# Patient Record
Sex: FEMALE | Race: WHITE | URBAN - METROPOLITAN AREA
[De-identification: names, ages, dates, MRNs, and addresses within clinical notes are randomized per-mention and may not be internally consistent; named-entity substitution may affect disease eponyms.]

---

## 2017-07-24 ENCOUNTER — HOSPITAL ENCOUNTER (INPATIENT)
Facility: CLINIC | Age: 57
LOS: 5 days | Discharge: HOME | DRG: 872 | End: 2017-07-29
Attending: EMERGENCY MEDICINE | Admitting: INTERNAL MEDICINE
Payer: COMMERCIAL

## 2017-07-24 ENCOUNTER — APPOINTMENT (OUTPATIENT)
Dept: CT IMAGING | Facility: CLINIC | Age: 57
DRG: 872 | End: 2017-07-24
Attending: EMERGENCY MEDICINE
Payer: COMMERCIAL

## 2017-07-24 DIAGNOSIS — K57.12: ICD-10-CM

## 2017-07-24 PROBLEM — K57.92 ACUTE DIVERTICULITIS: Status: ACTIVE | Noted: 2017-07-24

## 2017-07-24 LAB
ALBUMIN UR-MCNC: 30 MG/DL
ANION GAP SERPL CALCULATED.3IONS-SCNC: 5 MMOL/L (ref 3–14)
APPEARANCE UR: CLEAR
BACTERIA #/AREA URNS HPF: ABNORMAL /HPF
BASOPHILS # BLD AUTO: 0.1 10E9/L (ref 0–0.2)
BASOPHILS NFR BLD AUTO: 0.3 %
BILIRUB UR QL STRIP: NEGATIVE
BUN SERPL-MCNC: 10 MG/DL (ref 7–30)
CALCIUM SERPL-MCNC: 9.2 MG/DL (ref 8.5–10.1)
CHLORIDE SERPL-SCNC: 101 MMOL/L (ref 94–109)
CO2 SERPL-SCNC: 29 MMOL/L (ref 20–32)
COLOR UR AUTO: YELLOW
CREAT SERPL-MCNC: 0.83 MG/DL (ref 0.52–1.04)
DIFFERENTIAL METHOD BLD: ABNORMAL
EOSINOPHIL # BLD AUTO: 0 10E9/L (ref 0–0.7)
EOSINOPHIL NFR BLD AUTO: 0.1 %
ERYTHROCYTE [DISTWIDTH] IN BLOOD BY AUTOMATED COUNT: 12.4 % (ref 10–15)
GFR SERPL CREATININE-BSD FRML MDRD: 70 ML/MIN/1.7M2
GLUCOSE SERPL-MCNC: 134 MG/DL (ref 70–99)
GLUCOSE UR STRIP-MCNC: NEGATIVE MG/DL
HCT VFR BLD AUTO: 41.5 % (ref 35–47)
HGB BLD-MCNC: 14 G/DL (ref 11.7–15.7)
HGB UR QL STRIP: NEGATIVE
IMM GRANULOCYTES # BLD: 0.1 10E9/L (ref 0–0.4)
IMM GRANULOCYTES NFR BLD: 0.6 %
KETONES UR STRIP-MCNC: NEGATIVE MG/DL
LACTATE BLD-SCNC: 1.3 MMOL/L (ref 0.7–2.1)
LEUKOCYTE ESTERASE UR QL STRIP: ABNORMAL
LYMPHOCYTES # BLD AUTO: 1.2 10E9/L (ref 0.8–5.3)
LYMPHOCYTES NFR BLD AUTO: 6.2 %
MCH RBC QN AUTO: 31.5 PG (ref 26.5–33)
MCHC RBC AUTO-ENTMCNC: 33.7 G/DL (ref 31.5–36.5)
MCV RBC AUTO: 93 FL (ref 78–100)
MONOCYTES # BLD AUTO: 1.6 10E9/L (ref 0–1.3)
MONOCYTES NFR BLD AUTO: 8.1 %
MUCOUS THREADS #/AREA URNS LPF: PRESENT /LPF
NEUTROPHILS # BLD AUTO: 16.2 10E9/L (ref 1.6–8.3)
NEUTROPHILS NFR BLD AUTO: 84.7 %
NITRATE UR QL: NEGATIVE
NRBC # BLD AUTO: 0 10*3/UL
NRBC BLD AUTO-RTO: 0 /100
PH UR STRIP: 6 PH (ref 5–7)
PLATELET # BLD AUTO: 291 10E9/L (ref 150–450)
POTASSIUM SERPL-SCNC: 3.6 MMOL/L (ref 3.4–5.3)
RBC # BLD AUTO: 4.45 10E12/L (ref 3.8–5.2)
RBC #/AREA URNS AUTO: 2 /HPF (ref 0–2)
SODIUM SERPL-SCNC: 135 MMOL/L (ref 133–144)
SP GR UR STRIP: 1.02 (ref 1–1.03)
SQUAMOUS #/AREA URNS AUTO: 1 /HPF (ref 0–1)
URN SPEC COLLECT METH UR: ABNORMAL
UROBILINOGEN UR STRIP-MCNC: 0 MG/DL (ref 0–2)
WBC # BLD AUTO: 19.2 10E9/L (ref 4–11)
WBC #/AREA URNS AUTO: 1 /HPF (ref 0–2)

## 2017-07-24 PROCEDURE — 25000128 H RX IP 250 OP 636: Performed by: INTERNAL MEDICINE

## 2017-07-24 PROCEDURE — 25800025 ZZH RX 258: Performed by: INTERNAL MEDICINE

## 2017-07-24 PROCEDURE — 83605 ASSAY OF LACTIC ACID: CPT | Performed by: INTERNAL MEDICINE

## 2017-07-24 PROCEDURE — 25000128 H RX IP 250 OP 636: Performed by: EMERGENCY MEDICINE

## 2017-07-24 PROCEDURE — 81001 URINALYSIS AUTO W/SCOPE: CPT | Performed by: EMERGENCY MEDICINE

## 2017-07-24 PROCEDURE — 96374 THER/PROPH/DIAG INJ IV PUSH: CPT

## 2017-07-24 PROCEDURE — 25000125 ZZHC RX 250: Performed by: INTERNAL MEDICINE

## 2017-07-24 PROCEDURE — 74177 CT ABD & PELVIS W/CONTRAST: CPT

## 2017-07-24 PROCEDURE — 36415 COLL VENOUS BLD VENIPUNCTURE: CPT | Performed by: INTERNAL MEDICINE

## 2017-07-24 PROCEDURE — 85025 COMPLETE CBC W/AUTO DIFF WBC: CPT | Performed by: EMERGENCY MEDICINE

## 2017-07-24 PROCEDURE — 99285 EMERGENCY DEPT VISIT HI MDM: CPT | Mod: 25

## 2017-07-24 PROCEDURE — 99223 1ST HOSP IP/OBS HIGH 75: CPT | Mod: AI | Performed by: INTERNAL MEDICINE

## 2017-07-24 PROCEDURE — 12000000 ZZH R&B MED SURG/OB

## 2017-07-24 PROCEDURE — 80048 BASIC METABOLIC PNL TOTAL CA: CPT | Performed by: EMERGENCY MEDICINE

## 2017-07-24 RX ORDER — MULTIVITAMIN,THERAPEUTIC
1 TABLET ORAL DAILY
COMMUNITY

## 2017-07-24 RX ORDER — FAMOTIDINE 20 MG
1000 TABLET ORAL DAILY
COMMUNITY

## 2017-07-24 RX ORDER — ONDANSETRON 2 MG/ML
4 INJECTION INTRAMUSCULAR; INTRAVENOUS EVERY 6 HOURS PRN
Status: DISCONTINUED | OUTPATIENT
Start: 2017-07-24 | End: 2017-07-29 | Stop reason: HOSPADM

## 2017-07-24 RX ORDER — ZOLPIDEM TARTRATE 5 MG/1
5 TABLET ORAL
Status: DISCONTINUED | OUTPATIENT
Start: 2017-07-24 | End: 2017-07-29 | Stop reason: HOSPADM

## 2017-07-24 RX ORDER — ERTAPENEM 1 G/1
1 INJECTION, POWDER, LYOPHILIZED, FOR SOLUTION INTRAMUSCULAR; INTRAVENOUS ONCE
Status: COMPLETED | OUTPATIENT
Start: 2017-07-24 | End: 2017-07-24

## 2017-07-24 RX ORDER — ERTAPENEM 1 G/1
1 INJECTION, POWDER, LYOPHILIZED, FOR SOLUTION INTRAMUSCULAR; INTRAVENOUS EVERY 24 HOURS
Status: DISCONTINUED | OUTPATIENT
Start: 2017-07-25 | End: 2017-07-25

## 2017-07-24 RX ORDER — HYDROMORPHONE HYDROCHLORIDE 1 MG/ML
.3-.5 INJECTION, SOLUTION INTRAMUSCULAR; INTRAVENOUS; SUBCUTANEOUS
Status: DISCONTINUED | OUTPATIENT
Start: 2017-07-24 | End: 2017-07-29 | Stop reason: HOSPADM

## 2017-07-24 RX ORDER — NALOXONE HYDROCHLORIDE 0.4 MG/ML
.1-.4 INJECTION, SOLUTION INTRAMUSCULAR; INTRAVENOUS; SUBCUTANEOUS
Status: DISCONTINUED | OUTPATIENT
Start: 2017-07-24 | End: 2017-07-29 | Stop reason: HOSPADM

## 2017-07-24 RX ORDER — IOPAMIDOL 755 MG/ML
500 INJECTION, SOLUTION INTRAVASCULAR ONCE
Status: COMPLETED | OUTPATIENT
Start: 2017-07-24 | End: 2017-07-24

## 2017-07-24 RX ORDER — PROCHLORPERAZINE 25 MG
25 SUPPOSITORY, RECTAL RECTAL EVERY 12 HOURS PRN
Status: DISCONTINUED | OUTPATIENT
Start: 2017-07-24 | End: 2017-07-29 | Stop reason: HOSPADM

## 2017-07-24 RX ORDER — HYDROMORPHONE HYDROCHLORIDE 1 MG/ML
0.5 INJECTION, SOLUTION INTRAMUSCULAR; INTRAVENOUS; SUBCUTANEOUS
Status: DISCONTINUED | OUTPATIENT
Start: 2017-07-24 | End: 2017-07-27

## 2017-07-24 RX ORDER — PROCHLORPERAZINE MALEATE 5 MG
5-10 TABLET ORAL EVERY 6 HOURS PRN
Status: DISCONTINUED | OUTPATIENT
Start: 2017-07-24 | End: 2017-07-29 | Stop reason: HOSPADM

## 2017-07-24 RX ORDER — ACETAMINOPHEN 10 MG/ML
1000 INJECTION, SOLUTION INTRAVENOUS EVERY 6 HOURS PRN
Status: DISCONTINUED | OUTPATIENT
Start: 2017-07-24 | End: 2017-07-27

## 2017-07-24 RX ORDER — DEXTROSE MONOHYDRATE, SODIUM CHLORIDE, AND POTASSIUM CHLORIDE 50; 1.49; 4.5 G/1000ML; G/1000ML; G/1000ML
INJECTION, SOLUTION INTRAVENOUS CONTINUOUS
Status: DISCONTINUED | OUTPATIENT
Start: 2017-07-24 | End: 2017-07-29 | Stop reason: HOSPADM

## 2017-07-24 RX ORDER — METRONIDAZOLE 500 MG/1
500 TABLET ORAL 3 TIMES DAILY
Status: ON HOLD | COMMUNITY
Start: 2017-07-23 | End: 2017-07-29

## 2017-07-24 RX ORDER — CIPROFLOXACIN 500 MG/1
500 TABLET, FILM COATED ORAL 2 TIMES DAILY
Status: ON HOLD | COMMUNITY
Start: 2017-07-23 | End: 2017-07-29

## 2017-07-24 RX ORDER — ONDANSETRON 4 MG/1
4 TABLET, ORALLY DISINTEGRATING ORAL EVERY 6 HOURS PRN
Status: DISCONTINUED | OUTPATIENT
Start: 2017-07-24 | End: 2017-07-29 | Stop reason: HOSPADM

## 2017-07-24 RX ADMIN — SODIUM CHLORIDE 1000 ML: 9 INJECTION, SOLUTION INTRAVENOUS at 13:38

## 2017-07-24 RX ADMIN — SODIUM CHLORIDE 61 ML: 9 INJECTION, SOLUTION INTRAVENOUS at 12:04

## 2017-07-24 RX ADMIN — ERTAPENEM SODIUM 1 G: 1 INJECTION, POWDER, LYOPHILIZED, FOR SOLUTION INTRAMUSCULAR; INTRAVENOUS at 13:40

## 2017-07-24 RX ADMIN — HYDROMORPHONE HYDROCHLORIDE 0.5 MG: 1 INJECTION, SOLUTION INTRAMUSCULAR; INTRAVENOUS; SUBCUTANEOUS at 13:42

## 2017-07-24 RX ADMIN — HYDROMORPHONE HYDROCHLORIDE 0.5 MG: 1 INJECTION, SOLUTION INTRAMUSCULAR; INTRAVENOUS; SUBCUTANEOUS at 23:33

## 2017-07-24 RX ADMIN — IOPAMIDOL 85 ML: 755 INJECTION, SOLUTION INTRAVENOUS at 12:01

## 2017-07-24 RX ADMIN — HYDROMORPHONE HYDROCHLORIDE 0.3 MG: 1 INJECTION, SOLUTION INTRAMUSCULAR; INTRAVENOUS; SUBCUTANEOUS at 19:16

## 2017-07-24 RX ADMIN — ACETAMINOPHEN 1000 MG: 10 INJECTION, SOLUTION INTRAVENOUS at 16:16

## 2017-07-24 RX ADMIN — HYDROMORPHONE HYDROCHLORIDE 0.5 MG: 1 INJECTION, SOLUTION INTRAMUSCULAR; INTRAVENOUS; SUBCUTANEOUS at 11:12

## 2017-07-24 RX ADMIN — POTASSIUM CHLORIDE, DEXTROSE MONOHYDRATE AND SODIUM CHLORIDE: 150; 5; 450 INJECTION, SOLUTION INTRAVENOUS at 16:14

## 2017-07-24 ASSESSMENT — ENCOUNTER SYMPTOMS
VOMITING: 0
ABDOMINAL PAIN: 1
HEMATURIA: 0
APPETITE CHANGE: 1
BACK PAIN: 1
NAUSEA: 0
DYSURIA: 0
FREQUENCY: 0

## 2017-07-24 NOTE — PLAN OF CARE
Problem: Goal Outcome Summary  Goal: Goal Outcome Summary  Outcome: No Change  Pt admitted for diverticulitis.  Pain controlled right now with dilaudid. Denies nausea at this time. Bowel sounds active.  Plan of care conservative treatment, may need surgery consult.

## 2017-07-24 NOTE — IP AVS SNAPSHOT
MRN:[id number]                      After Visit Summary   7/24/2017    Jennifer Payne    MRN: [id number]           Thank you!     Thank you for choosing [facility] for your care. Our goal is always to provide you with excellent care. Hearing back from our patients is one way we can continue to improve our services. Please take a few minutes to complete the written survey that you may receive in the mail after you visit. If you would like to speak to someone directly about your visit please contact Patient Relations at [phone]. Thank you!          Patient Information     Date Of Birth          1960        Designated Caregiver       Most Recent Value    Caregiver    Will someone help with your care after discharge? no      About your hospital stay     You were admitted on:  July 24, 2017 You last received care in the:  [place] [address] [facility]    You were discharged on:  July 29, 2017       Who to Call     For medical emergencies, please call 911.  For non-urgent questions about your medical care, please call your primary care provider or clinic, [phone]          Attending Provider     Provider Specialty    Jeff Gill MD Emergency Medicine    [facility], [facility]       Primary Care Provider Office Phone # Fax #    [facility] [phone]      After Care Instructions     Activity       Your activity upon discharge: activity as tolerated            Diet       Follow this diet upon discharge: Low fiber                  Follow-up Appointments     Follow-up and recommended labs and tests        Follow up with primary care provider, [facility], within 7 days for hospital follow- up.  Follow up with General Surgery within 2 weeks.                  Further instructions from your care team       HOME CARE FOLLOWING DIVERTICULITIS ADMISSION  CARMENZA Hicks E. Gavin, N. Guttormson, D.  "SHIELA Lenz L. Thomas   Special instructions for Jennifer Payne:  --call and schedule visit with Dr. Lenz at the end of your antibiotic course    --Discharge prescriptions: Augmentin x 14 days         DRAIN/DRAIN SITE CARE:  If you are discharged from the hospital with a drain in place, monitor and record the output as instructed by your nurse.  Once the output has appropriately decreased, the drain will be removed at your surgeon's office.  Replace the bandage over your drain (or previous drain site) until all drainage stops, or if more comfortable to have in place.      BATHING:  If you had a drain in place at any time, avoid baths until 1 week after drain removal.  Showers are okay any time after the drain is out.  You may wash your hair at any time.  Gently pat your incision dry after bathing before replacing a dressing.    ACTIVITY:  Light Activity -- you may immediately be up and about as tolerated.  Driving -- you may drive when comfortable and off narcotic pain medications.  Light Work -- resume when comfortable off pain medications.  (If you can drive, you probably can work.)  Strenuous Work/Activity -- limit lifting to 15 pounds for 1-2 weeks.  Then, progressively increase with time.  Active Sports (running, biking, etc.) -- cautiously resume after 4-6 weeks, or when cleared by your surgeon.    DISCOMFORT:  Use pain medications as prescribed by your surgeon.  Take the pain medication with some food, when possible, to minimize side effects.  Expect gradual improvement.    ANTIBIOTIC THERAPY:  Finish the entire course of antibiotics which have been prescribed.  Contact your surgeon's office if you finish your course of antibiotics and are still feeling any residual abdominal pain or signs of your infection.    DIET:  Continue on a \"soft\" diet (i.e. cooked vegetables, soups, processed meats, light/white bread, mashed potatoes, rice, yogurt) for the first week after discharge from " the hospital.  After this time, you may return to diet you were on before surgery.  In general, and specifically while taking pain medications, increase dietary fiber or add a fiber supplementation like Metamucil or Citrucel to help prevent constipation (this is also a possible side effect of pain medications).  Drink plenty of fluids.    RETURN APPOINTMENT:  Schedule a follow-up visit 2-3 weeks after discharge from the hospital.  Office Phone:  [phone]     CONTACT US IF THE FOLLOWING DEVELOPS:   1. A fever that is above 101     2. If there is a large amount of drainage, bleeding, or swelling.   3. Severe pain that is not relieved by your prescription.   4. Drainage that is thick, cloudy, yellow, green or white.   5. Any other questions not answered by  Frequently Asked Questions  sheet.      FREQUENTLY ASKED QUESTIONS:    Q:  What can I do to minimize constipation (very hard stools, or lack of stools)?  A:  Stay well hydrated.  Increase your dietary fiber intake or take a fiber supplement -with plenty of water.  Walk around frequently.  You may consider an over-the-counter stool-softener.  Your Pharmacist can assist you with choosing one that is stocked at your pharmacy.  Constipation is also one of the most common side effects of pain medication.  If you are using pain medication, be pro-active and try to PREVENT problems with constipation by taking the steps above BEFORE constipation becomes a problem.    Q:  What do I do if I need more pain medications?  A:  Call the office to receive refills.  Be aware that certain pain meds cannot be called into a pharmacy and actually require a paper prescription.  A change may be made in your pain med as you progress thru your recovery period or if you have side effects to certain meds.    --Pain meds are NOT refilled after 5pm on weekdays, and NOT AT ALL on the weekends, so please look ahead to prevent problems.      Q:  Why am I having a hard time sleeping now that I  am at home?  A:  Many medications you receive while you are in the hospital can impact your sleep for a number of days after your surgery/hospitalization.  Decreased level of activity and naps during the day may also make sleeping at night difficult.  Try to minimize day-time naps, and get up frequently during the day to walk around your home during your recovery time.  Sleep aides may be of some help, but are not recommended for long-term use.      Q:  I am having some back discomfort.  What should I do?  A:  This may be related to certain positioning that was required for your surgery, extended periods of time in bed, or other changes in your overall activity level.  You may try ice, heat, acetaminophen, or ibuprofen to treat this temporarily.  Note that many pain medications have acetaminophen in them and would state this on the prescription bottle.  Be sure not to exceed the maximum of 4000mg per day of acetaminophen.     **If the pain you are having does not resolve, is severe, or is a flare of back pain you have had on other occasions prior to surgery, please contact your primary physician for further recommendations or for an appointment to be examined at their office.    Q:  Why am I having headaches?  A:  Headaches can be caused by many things:  caffeine withdrawal, use of pain meds, dehydration, high blood pressure, lack of sleep, over-activity/exhaustion, flare-up of usual migraine headaches.  If you feel this is related to muscle tension (a band-like feeling around the head, or a pressure at the low-back of the head) you may try ice or heat to this area.  You may need to drink more fluids (try electrolyte drink like Gatorade), rest, or take your usual migraine medications.   **If your headaches do not resolve, worsen, are accompanied by other symptoms, or if your blood pressure is high, please call your primary physician for recommendation and/or examination.        If you have other questions, please  "call the office Monday thru Friday between 8am and 5pm to discuss with the nurse or physician assistant.  #[phone]    There is a surgeon ON CALL on weekday evenings and over the weekend in case of urgent need only, and may be contacted at the same number.    If you are having an emergency, call 911 or proceed to your nearest emergency department.    Pending Results     No orders found from 2017 to 2017.            Statement of Approval     Ordered          17 1026  I have reviewed and agree with all the recommendations and orders detailed in this document.  EFFECTIVE NOW     Approved and electronically signed by:  Sudeep Garcia, DO           17 0957  I have reviewed and agree with all the recommendations and orders detailed in this document.  EFFECTIVE NOW     Approved and electronically signed by:  Lucina Campos PA-C             Admission Information     Date & Time Provider Department Dept. Phone    2017 Jesus Sosa MD [place] [address] [facility] [phone]      Your Vitals Were     Blood Pressure Pulse Temperature Respirations Weight Pulse Oximetry    128/79 (BP Location: Left arm) 81 98.6  F (37  C) (Oral) 16 78.5 kg (173 lb 1.6 oz) 98%      MyChart Information     [facility] lets you send messages to your doctor, view your test results, renew your prescriptions, schedule appointments and more. To sign up, go to [email or web address] . Click on \"Log in\" on the left side of the screen, which will take you to the Welcome page. Then click on \"Sign up Now\" on the right side of the page.     You will be asked to enter the access code listed below, as well as some personal information. Please follow the directions to create your username and password.     Your access code is: [email or web address]  Expires: 10/23/2017 10:15 AM     Your access code will  in 90 days. If you need help or a new code, please call your [place] clinic or [phone].        Care " EveryWhere ID     This is your Care EveryWhere ID. This could be used by other organizations to access your [place] medical records  [id number]        Equal Access to Services     JACK HARMON : Leo Benavides, rubyjanice wooddiogoha, ernike kavarsha mathias, ramila omer servando salgueroAsha So [facility] [phone].    ATENCIÓN: Si habla español, tiene a carter disposición servicios gratuitos de asistencia lingüística. Llame al [phone].    We comply with applicable federal civil rights laws and [place] laws. We do not discriminate on the basis of race, color, national origin, age, disability sex, sexual orientation or gender identity.               Review of your medicines      START taking        Dose / Directions    amoxicillin-clavulanate 875-125 MG per tablet   Commonly known as:  AUGMENTIN   Used for:  Diverticulitis, jejunum        Dose:  1 tablet   Take 1 tablet by mouth 2 times daily for 14 days   Quantity:  28 tablet   Refills:  0         CONTINUE these medicines which have NOT CHANGED        Dose / Directions    multivitamin, therapeutic Tabs tablet        Dose:  1 tablet   Take 1 tablet by mouth daily   Refills:  0       Vitamin D (Cholecalciferol) 1000 UNITS Caps        Dose:  1000 Units   Take 1,000 Units by mouth daily   Refills:  0         STOP taking     CIPRO 500 MG tablet   Generic drug:  ciprofloxacin           FLAGYL 500 MG tablet   Generic drug:  metroNIDAZOLE                Where to get your medicines      Some of these will need a paper prescription and others can be bought over the counter. Ask your nurse if you have questions.     Bring a paper prescription for each of these medications     amoxicillin-clavulanate 875-125 MG per tablet                Protect others around you: Learn how to safely use, store and throw away your medicines at www.disposemymeds.org.             Medication List: This is a list of all your medications and when to take them. Check marks below  indicate your daily home schedule. Keep this list as a reference.      Medications           Morning Afternoon Evening Bedtime As Needed    amoxicillin-clavulanate 875-125 MG per tablet   Commonly known as:  AUGMENTIN   Take 1 tablet by mouth 2 times daily for 14 days                                multivitamin, therapeutic Tabs tablet   Take 1 tablet by mouth daily                                Vitamin D (Cholecalciferol) 1000 UNITS Caps   Take 1,000 Units by mouth daily                                          More Information        Low-Fiber Diet     Eggs are high in protein and easy to digest.     Eating a low-fiber diet means eating foods that don t have much fiber. These foods are easy to digest.  Most of the fiber that you eat passes undigested through your bowel. This is what forms stool. Low-fiber foods can help to slow down your bowel movements. When you eat a low-fiber diet, you have fewer stools. This lets your intestine rest.  Your healthcare provider will tell you how long you need to be on this diet. It may only be for a short time. Low-fiber foods often don't give you all the nutrients you need to keep healthy. Your provider may have you take certain vitamins while you are on this diet.  Reasons to eat a low-fiber diet  The goal of a low-fiber diet is to limit the size and number of your stools. It may be prescribed if you:    Are having chemotherapy or radiation treatments    Have had intestinal surgery    Have a condition that affects your intestine, such as irritable bowel syndrome, Crohn s disease, ulcerative colitis, or diverticulitis  General guidelines for a low-fiber diet  In general, a low-fiber diet means having fewer than 13 grams of fiber a day. Your provider may give you a list of things you can and can t eat or drink. Read food labels. Choose foods and drinks that have as close to zero grams of fiber as possible. Here are general guidelines to follow:  Breads, pasta, cereal, rice, and  other starches (6 to 11 servings daily)    What to choose: white bread, biscuits, muffins, and white rolls; plain crackers; waffles; white pasta; white rice; cream of wheat; grits; white pancakes; corn flakes; cooked potatoes without skin.  Fiber content of these foods should be less than 0.5 (1/2) gram per serving.    What to avoid: whole-wheat or whole-grain breads, crackers, and pasta; breads with seeds or nuts; wheat germ; bird crackers; cornbread; wild or brown rice; whole-grain, bran, and granola cereals; cereals with seeds, nuts, coconut, or dried fruit; potatoes with skin  Milk and dairy (2 servings daily)    What to choose: milk, buttermilk; yogurt or ice cream without seeds or nuts; custard or pudding; sour cream; cheese and cottage cheese    What to avoid: ice cream and yogurt with seeds, nuts, or fruit chunks  Fruit (2 to 4 servings daily)    What to choose: ripe banana; ripe nectarine, peach, apricot, papaya, and plum; soft honeydew melon and cantaloupe; cooked or canned fruit without skin or seeds (not sweetened with sorbitol); applesauce; strained fruit juice (without pulp)    What to avoid: raw or dried fruit; all berries; raisins; canned and raw pineapple; prunes and prune juice; fruit juice with pulp  Vegetables (3 to 5 servings daily)    What to choose: well-cooked or canned vegetables without seeds, such as spinach, eggplant, green and wax beans, carrots, yellow squash, pumpkin; lettuce on a sandwich    What to avoid: all raw or steamed vegetables; vegetables with seeds, such as unstrained tomato sauce; green peas; lima beans; broccoli; corn; parsnips  Meats and protein (4 to 6 ounces daily)    What to choose: tender, well-cooked meat, including ground meat, poultry, and fish; eggs; tofu; creamy peanut butter    What to avoid: tough, chewy meat with gristle; peas, including split, yellow, and black-eyed; beans, including navy, lima, black, garbanzo, soy, clayton, and lentil; peanuts and crunchy  peanut butter   Fats, oils, sauces, condiments (fewer than 8 teaspoons daily)    What to choose: butter, magarine, oils, whipped cream, sour cream, mayonnaise, smooth dressings and sauces; plain gravy; smooth condiments    What to avoid: dressing with seeds or fruit chunks; pickles and relishes  Other foods and drinks    What to choose: water; plain gelatin; plain puddings; pretzels; plain cookies and cakes; honey, syrup; decaffeinated drinks, including tea and coffee      What to avoid: popcorn; potato chips; spicy foods; fried, greasy foods; alcohol (ask your provider); marmalade, jam, and preserves; desserts that have seeds, nuts, coconut, dried fruit, whole grains or bran; candy that has seeds or nuts; drinks sweetened with sorbitol or other sugar substitutes; caffeinated drinks, including tea, coffee, soda, and energy drinks  Date Last Reviewed: 6/18/2015 2000-2017 [employer]. [address] [place] [address]. All rights reserved. This information is not intended as a substitute for professional medical care. Always follow your healthcare professional's instructions.

## 2017-07-24 NOTE — H&P
[facility]  Hospitalist Admission Note  July 24, 2017  Name: Jennifer Payne    MRN: [id number]  YOB: 1960    Age: 57 year old  Date of admission: 7/24/2017  Primary care provider: [facility], [place]      Summary:  Jennifer Payne is a 57 year old female with a history of diverticulitis who presents with left-sided abdominal pain along with fever and chills.  Interestingly, CT of the abdomen and pelvis demonstrates a 6 cm diverticulum in the left upper quadrant involving the jejunum with surrounding fat stranding.    Problem list  1. Acute Sepsis secondary to acute diverticulitis of the jejunum: As evidenced by fever, tachycardia, leukocytosis and clear source of infection.  Patient reports having previous flareups of diverticulitis but was treated conservatively with improvement but does not recall being told that she had a jejunal diverticulum and also thought that it was colonic in nature.  This is felt to improve despite being on one day of p.o. Cipro and Flagyl.    Plan:    Inpatient admission for treatment of sepsis    Patient was given a dose of Invanz in the ED which will be continued    Serial abdominal exams    Consider surgical consultation if this fails to improve with conservative management, but do recommend that if this improves with conservative management, she should seek outpatient general surgery to discuss surgical intervention to prevent future flareups.  Patient is agreeable with this and would prefer to stay with him the  system if able.    Clear liquids for now but if improves, may advance to a low residue diet in 1-2 days        All lab work and imaging data independently reviewed by myself    Prophylaxis;  Low risk/Ambulation.   Discharge: Home when able  Time spent: Greater than 35 minutes  Chief Complaint:    Left-sided abdominal pain    HPI  Jennifer Payne is a 57 year old female with a history of diverticulitis who  "presents with left-sided abdominal pain. The patient states that last week she was traveling to [place] for work when she began having some periumbilical abdominal pain which subsided and came back early yesterday morning. Her pain returned and was more severe yesterday morning. The patient then presented in clinic where she was diagnosed with diverticulitis and given prescriptions for Flagyl and Cipro. Since then she has taken three doses of antibiotics and the pain has continued to worsen which led her to present to the ED. Upon her arrival here she describes left lower quadrant abdominal pain which is constant and goes between sharp and dull with radiation into her lower back. She also notes having a fever of 100 yesterday and decreased appetite. She has taken Tylenol, last dose at 0300, which has helped but not completely relieved her pain. Patient denies urinary symptoms, nausea and vomiting. Patient denies all other complaints.    I did discuss the case in detail with the ED physician.     Past Medical History:     Past Medical History:   Diagnosis Date     Diverticula of intestine      Past Surgical History:     Past Surgical History:   Procedure Laterality Date     GYN SURGERY      hysterectomy     Social History:     Social History   Substance Use Topics     Smoking status: Never Smoker     Smokeless tobacco: Never Used     Alcohol use Yes      Comment: \"not very much\"     Family History:  Family history reviewed. NO pertinent family history     Allergies:   No Known Allergies  Medications:     Prescriptions Prior to Admission   Medication Sig Dispense Refill Last Dose     ciprofloxacin (CIPRO) 500 MG tablet Take 500 mg by mouth 2 times daily For 10 days   7/24/2017 at am     metroNIDAZOLE (FLAGYL) 500 MG tablet Take 500 mg by mouth 3 times daily For 10 days   7/24/2017 at am     Vitamin D, Cholecalciferol, 1000 UNITS CAPS Take 1,000 Units by mouth daily   7/23/2017     multivitamin, therapeutic (THERA-VIT) " TABS tablet Take 1 tablet by mouth daily   7/23/2017       Review of Systems:   A Comprehensive greater than 10 system review of systems was carried out.  Pertinent positives and negatives are noted above.  Otherwise negative for contributory information.        Physical Exam:  Blood pressure 130/72, pulse 94, temperature 101.7  F (38.7  C), temperature source Oral, resp. rate 20, weight 78.5 kg (173 lb 1.6 oz), SpO2 96 %.  Gen: Pleasant in no acute distress.  HEENT: NCAT. EOMI. PERRL.  Neck: Normal inspection. No bruit, JVD or thyromegaly.  Lungs: Normal respiratory effort.Clear to auscultation bilaterally with no crackles or wheezes.  Card: N s1s2. RRR. No M/R/G.  Peripheral pulses present and symmetric.   Abd: Soft left-sided abdominal tenderness greatest in the left upper quadrant.  No rebound or guarding however.  ND. No mass.  Hypoactive bowel sounds.  Skin: No rash. Warm to the touch  Extr: No edema. CMS intact  Psychiatric: Patient alert oriented ×3.  Normal affect  Neurologic: Cranial nerves II-XII are intact.      Data:       Recent Labs  Lab 07/24/17  1113   WBC 19.2*   HGB 14.0   HCT 41.5   MCV 93          Recent Labs  Lab 07/24/17  1113      POTASSIUM 3.6   CHLORIDE 101   CO2 29   ANIONGAP 5   *   BUN 10   CR 0.83   GFRESTIMATED 70   GFRESTBLACK 85   BRANDI 9.2       Imaging:   Recent Results (from the past 24 hour(s))   CT Abdomen Pelvis w Contrast    Narrative    CT ABDOMEN AND PELVIS WITH CONTRAST   7/24/2017 12:10 PM     HISTORY: Left lower quadrant pain. Failed outpatient management for  presumed diverticulitis.    COMPARISON: None.    TECHNIQUE: Following the uneventful administration of 85 mL Isovue-370  intravenous contrast, helical sections were acquired from the top of  the diaphragm through the pubic symphysis. Coronal reconstructions  were generated. Radiation dose for this scan was reduced using  automated exposure control, adjustment of the mA and/or kV according  to the  patient's size, or iterative reconstruction technique.    FINDINGS:     Abdomen: Two subcentimeter low-attenuation lesions in the liver, too  small to characterize. The spleen, pancreas, adrenal glands and right  kidney are unremarkable. Several left renal peripelvic cysts. The  gallbladder is present. Small hiatal hernia. No enlarged lymph nodes  in the upper abdomen.    Scan through the lower chest is unremarkable.    Pelvis: The small and large bowel are normal in caliber. The appendix  is likely visualized and unremarkable. Several diverticula are present  within the colon and also a few scattered within the small bowel. A 6  cm diverticulum is present in the jejunum in the upper left  hemiabdomen (series 2 image 34 and series 3 image 48). The jejunum at  this location is mildly thick-walled. Moderate edema is present within  the mesenteric fat about this diverticulum. These findings are  consistent with jejunal diverticulitis. No extraluminal gas or  loculated fluid collections in the abdomen or pelvis. The uterus is  not visualized. No enlarged lymph nodes in the pelvis. A very small  amount of free fluid in the pelvis.      Impression    IMPRESSION: Diverticulitis of a giant 6 cm diverticulum in the jejunum  in the upper left hemiabdomen. There is a moderate amount of edema  within the surrounding mesenteric fat. No visualized extraluminal gas  or abscess.    MD Jesus SANTOS MD Pager [phone]

## 2017-07-24 NOTE — PHARMACY-ADMISSION MEDICATION HISTORY
Admission medication history interview status for this patient is complete. See [facility] admission navigator for allergy information, prior to admission medications and immunization status.     Medication history interview source(s):Patient  Medication history resources (including written lists, pill bottles, clinic record): Care Everywhere record    Changes made to PTA medication list:  Added: all meds  Deleted: cephalexin  Changed: none    Actions taken by pharmacist (provider contacted, etc):None     Additional medication history information:None    Medication reconciliation/reorder completed by provider prior to medication history? No    Prior to Admission medications    Medication Sig Last Dose Taking? Auth Provider   ciprofloxacin (CIPRO) 500 MG tablet Take 500 mg by mouth 2 times daily For 10 days 7/24/2017 at am Yes Reported, Patient   metroNIDAZOLE (FLAGYL) 500 MG tablet Take 500 mg by mouth 3 times daily For 10 days 7/24/2017 at am Yes Reported, Patient   Vitamin D, Cholecalciferol, 1000 UNITS CAPS Take 1,000 Units by mouth daily 7/23/2017 Yes Reported, Patient   multivitamin, therapeutic (THERA-VIT) TABS tablet Take 1 tablet by mouth daily 7/23/2017 Yes Reported, Patient

## 2017-07-24 NOTE — ED PROVIDER NOTES
History     Chief Complaint:  Abdominal Pain      HPI   Jennifer Payne is a 57 year old female who presents with abdominal pain. The patient states that last week she was traveling to [place] for work when she began having some periumbilical abdominal pain which subsided and came back early yesterday morning. Her pain returned and was more severe yesterday morning. The patient then presented in clinic where she was diagnosed with diverticulitis and given prescriptions for Flagyl and Cipro. Since then she has taken three doses of antibiotics and the pain has continued to worsen which led her to present to the ED. Upon her arrival here she describes left lower quadrant abdominal pain which is constant and goes between sharp and dull with radiation into her lower back. She also notes having a fever of 100 yesterday and decreased appetite. She has taken Tylenol, last dose at 0300, which has helped but not completely relieved her pain. Patient denies urinary symptoms, nausea and vomiting. Patient denies all other complaints.     Allergies:  No known drug allergies      Medications:    Flagyl  Cipro  Cephalexin    Past Medical History:    Diverticula of Intestine    Past Surgical History:    Hysterectomy    Family History:    History reviewed. No pertinent family history.      Social History:  Presents alone   Tobacco use: never  Alcohol use: yes, not very much  PCP: [place] [facility]    Marital Status:        Review of Systems   Constitutional: Positive for appetite change.   Gastrointestinal: Positive for abdominal pain. Negative for nausea and vomiting.   Genitourinary: Negative for dysuria, frequency, hematuria and urgency.   Musculoskeletal: Positive for back pain.   All other systems reviewed and are negative.    Physical Exam     Patient Vitals for the past 24 hrs:   BP Temp Temp src Pulse Resp SpO2 Weight   07/24/17 1344 - - - - - 98 % -   07/24/17 1343 - - - - - 97 % -   07/24/17 1342 - - - -  - 97 % -   07/24/17 1341 - - - - - 97 % -   07/24/17 1330 125/79 - - - - - -   07/24/17 1315 138/88 - - - - - -   07/24/17 1300 (!) 136/91 - - - - - -   07/24/17 1245 127/77 - - - - - -   07/24/17 1230 146/89 - - - - 98 % -   07/24/17 1215 134/79 - - - - 97 % -   07/24/17 1145 - - - - - 96 % -   07/24/17 1130 134/78 - - - - 95 % -   07/24/17 1115 132/75 - - - - 99 % -   07/24/17 1021 124/80 98.6  F (37  C) Oral 98 20 99 % 77.1 kg (170 lb)        Physical Exam    Constitutional:  Pleasant, age appropriate female in obvious pain.  HEENT:    Oropharynx is moist, without lesions or trismus.  Eyes:    Conjunctiva normal  Neck:    Supple, no meningismus.     CV:     Regular rate and rhythm.      No murmurs, rubs or gallops.     No lower extremity edema.  PULM:    Clear to auscultation bilateral.       No respiratory distress.      Good air exchange.     No rales or wheezing  ABD:    Soft, non-distended.       Moderate-severe tenderness in the LUQ/LLQ.     Bowel sounds normal.     No pulsatile masses.       No rebound, guarding or rigidity.     No CVA tenderness. .  MSK:     No gross deformity to all four extremities.   LYMPH:   No cervical lymphadenopathy.  NEURO:   Alert.  Good muscular tone, no atrophy.  Skin:    Warm, dry and intact.    Psych:    Mood is good and affect is appropriate.    Emergency Department Course   Imaging:  Radiographic findings were communicated with the patient who voiced understanding of the findings.    CT Abdomen Pelvis w/ Contrast:   IMPRESSION: Diverticulitis of a giant 6 cm diverticulum in the jejunum in the upper left hemiabdomen. There is a moderate amount of edema within the surrounding mesenteric fat. No visualized extraluminal gas or abscess.     Results per radiology.     Laboratory:  CBC: WBC 19.2 (H) o/w WNL (HGB 14.0, )    BMP: Glucose 134 (H) o/w WNL (Creatinine 0.83)     UA with micro: Leukocyte Esterase Trace (A), Protein Albumin 30 (A), Bacteria Moderate (A), Mucous  Present (A) o/w negative     Interventions:1112: Dilaudid 0.5 mg IV  1112: NS 1L IV Bolus   1338: NS 1L IV Bolus   1340: Invanz 1 g vial to attach to  ml IV  1342: Dilaudid 0.5 mg IV    The patient's symptoms were partially improved with parenteral narcotics.    Emergency Department Course:  Past medical records, nursing notes, and vitals reviewed.  1039: I performed an exam of the patient and obtained history, as documented above.  IV inserted and blood drawn.   Above interventions provided.   The patient was sent for a abdomen/pelvis CT while in the emergency department, findings above.   I personally reviewed the laboratory results with the Patient and answered all related questions prior to admission.  Findings and plan explained to the Patient who consents to admission.   1241: Discussed the patient with Dr. Sosa, who will admit the patient to a med/surg bed for further monitoring, evaluation, and treatment.        Impression & Plan    Medical Decision Makin-year-old female with history of diverticulitis presents the ED with developing left lower quadrant pain and failed outpatient management of presumed clinical diagnosis of recurrent diverticulitis. Laboratory studies are unrevealing other than leukocytosis. Advanced imaging with CT scan was undertaken given her failure of outpatient management to rule out alternative diagnosis as well as complicated diverticulitis. Patient was found to have a large jejunal diverticula with associated diverticulitis. There are no complicating factors such as abscess or perforation. Patient was given antibiotics and will be transferred to medical bed given her outpatient treatment failure    Diagnosis:    ICD-10-CM   1. Diverticulitis, jejunum K57.12       Disposition:  Admitted to med/surg bed.       2017   [facility] EMERGENCY DEPARTMENT  Lynn BLEDSOE am serving as a scribe at 10:39 AM on 2017 to document services personally performed  by Jeff Gill MD based on my observations and the provider's statements to me.       Jeff Gill MD  07/24/17 [id number]

## 2017-07-24 NOTE — ED NOTES
[facility]  ED Nurse Handoff Report    Jennifer Power is a 57 year old female   ED Chief complaint: Abdominal Pain  . ED Diagnosis:   Final diagnoses:   Diverticulitis, jejunum     Allergies: No Known Allergies    Code Status: Full Code  Activity level - Baseline/Home:  Independent. Activity Level - Current:   Independent. Lift room needed: No. Bariatric: No   Needed: No   Isolation: No. Infection: Not Applicable.     Vital Signs:   Vitals:    07/24/17 1021   BP: 124/80   Pulse: 98   Resp: 20   Temp: 98.6  F (37  C)   TempSrc: Oral   SpO2: 99%   Weight: 77.1 kg (170 lb)       Cardiac Rhythm:  ,      Pain level: 0-10 Pain Scale: 8  Patient confused: No. Patient Falls Risk: Yes.   Elimination Status: Has voided   Patient Report / Focused Assessment:   Gastrointestinal - Gastrointestinal Comment: pt comes in with left sided abd pain since yesterday. py seen at urgent care yesterday and told to come to er if pain worsened.   Tests Performed / Abnormal Results:   Results for JENNIFER POWER (MRN [id number]) as of 7/24/2017 12:36   Ref. Range 7/24/2017 11:00 7/24/2017 11:13 7/24/2017 12:10   Sodium Latest Ref Range: 133 - 144 mmol/L  135    Potassium Latest Ref Range: 3.4 - 5.3 mmol/L  3.6    Chloride Latest Ref Range: 94 - 109 mmol/L  101    Carbon Dioxide Latest Ref Range: 20 - 32 mmol/L  29    Urea Nitrogen Latest Ref Range: 7 - 30 mg/dL  10    Creatinine Latest Ref Range: 0.52 - 1.04 mg/dL  0.83    GFR Estimate Latest Ref Range: >60 mL/min/1.7m2  70    GFR Estimate If Black Latest Ref Range: >60 mL/min/1.7m2  85    Calcium Latest Ref Range: 8.5 - 10.1 mg/dL  9.2    Anion Gap Latest Ref Range: 3 - 14 mmol/L  5    Glucose Latest Ref Range: 70 - 99 mg/dL  134 (H)    WBC Latest Ref Range: 4.0 - 11.0 10e9/L  19.2 (H)    Hemoglobin Latest Ref Range: 11.7 - 15.7 g/dL  14.0    Hematocrit Latest Ref Range: 35.0 - 47.0 %  41.5    Platelet Count Latest Ref Range: 150 - 450 10e9/L  291    RBC  Count Latest Ref Range: 3.8 - 5.2 10e12/L  4.45    MCV Latest Ref Range: 78 - 100 fl  93    MCH Latest Ref Range: 26.5 - 33.0 pg  31.5    MCHC Latest Ref Range: 31.5 - 36.5 g/dL  33.7    RDW Latest Ref Range: 10.0 - 15.0 %  12.4    Diff Method Unknown  Automated Method    % Neutrophils Latest Units: %  84.7    % Lymphocytes Latest Units: %  6.2    % Monocytes Latest Units: %  8.1    % Eosinophils Latest Units: %  0.1    % Basophils Latest Units: %  0.3    % Immature Granulocytes Latest Units: %  0.6    Nucleated RBCs Latest Ref Range: 0 /100  0    Absolute Neutrophil Latest Ref Range: 1.6 - 8.3 10e9/L  16.2 (H)    Absolute Lymphocytes Latest Ref Range: 0.8 - 5.3 10e9/L  1.2    Absolute Monocytes Latest Ref Range: 0.0 - 1.3 10e9/L  1.6 (H)    Absolute Eosinophils Latest Ref Range: 0.0 - 0.7 10e9/L  0.0    Absolute Basophils Latest Ref Range: 0.0 - 0.2 10e9/L  0.1    Abs Immature Granulocytes Latest Ref Range: 0 - 0.4 10e9/L  0.1    Absolute Nucleated RBC Unknown  0.0    Color Urine Unknown Yellow     Appearance Urine Unknown Clear     Glucose Urine Latest Ref Range: NEG mg/dL Negative     Bilirubin Urine Latest Ref Range: NEG  Negative     Ketones Urine Latest Ref Range: NEG mg/dL Negative     Specific Gravity Urine Latest Ref Range: 1.003 - 1.035  1.024     pH Urine Latest Ref Range: 5.0 - 7.0 pH 6.0     Protein Albumin Urine Latest Ref Range: NEG mg/dL 30 (A)     Urobilinogen mg/dL Latest Ref Range: 0.0 - 2.0 mg/dL 0.0     Nitrite Urine Latest Ref Range: NEG  Negative     Blood Urine Latest Ref Range: NEG  Negative     Leukocyte Esterase Urine Latest Ref Range: NEG  Trace (A)     Source Unknown Midstream Urine     WBC Urine Latest Ref Range: 0 - 2 /HPF 1     RBC Urine Latest Ref Range: 0 - 2 /HPF 2     Bacteria Urine Latest Ref Range: NEG /HPF Moderate (A)     Squamous Epithelial /HPF Urine Latest Ref Range: 0 - 1 /HPF 1     Mucous Urine Latest Ref Range: NEG /LPF Present (A)     CT ABDOMEN PELVIS W CONTRAST  Unknown   Rpt     CT ABD. -   IMPRESSION: Diverticulitis of a giant 6 cm diverticulum in the jejunum  in the upper left hemiabdomen. There is a moderate amount of edema  within the surrounding mesenteric fat. No visualized extraluminal gas  or abscess.  Treatments provided: lab, ct, pain meds, urine.  Family Comments: none  OBS brochure/video discussed/provided to patient:  No  ED Medications:   Medications   HYDROmorphone (PF) (DILAUDID) injection 0.5 mg (0.5 mg Intravenous Given 7/24/17 1112)   0.9% sodium chloride BOLUS (not administered)   ertapenem (INVanz) 1 g vial to attach to  mL bag (not administered)   0.9% sodium chloride BOLUS (0 mLs Intravenous Stopped 7/24/17 1206)   iopamidol (ISOVUE-370) solution 500 mL (85 mLs Intravenous Given 7/24/17 1201)     Drips infusing:  No  For the majority of the shift this patient was Green. Interventions performed were lab, ct, pain meds, urine.     Severe Sepsis OR Septic Shock Diagnosis Present: No      ED Nurse Name/Phone Number: Crys Guzman,   12:34 PM    RECEIVING UNIT ED HANDOFF REVIEW    Above ED Nurse Handoff Report was reviewed: YES  Reviewed by: Princess Mirza on July 24, 2017 at 1:16 PM

## 2017-07-24 NOTE — IP AVS SNAPSHOT
[place] [id number] [facility]    [address]    [facility] [address]    Phone:  [phone]    Fax:  [phone]                                       After Visit Summary   7/24/2017    Jennifer Payne    MRN: [id number]           After Visit Summary Signature Page     I have received my discharge instructions, and my questions have been answered. I have discussed any challenges I see with this plan with the nurse or doctor.    ..........................................................................................................................................  Patient/Patient Representative Signature      ..........................................................................................................................................  Patient Representative Print Name and Relationship to Patient    ..................................................               ................................................  Date                                            Time    ..........................................................................................................................................  Reviewed by Signature/Title    ...................................................              ..............................................  Date                                                            Time

## 2017-07-24 NOTE — ED NOTES
Pt here with LLQ pain that started 2 nights ago. Pt was seen by PCP yesterday diagnosed with probable diverticulitis that she was started antibiotics yesterday for. Pt reports this is much worse and constant pain than the bout of diverticulitis that she had 2 years ago. Pt denies diarrhea or vomiting, nausea present

## 2017-07-25 LAB
ERYTHROCYTE [DISTWIDTH] IN BLOOD BY AUTOMATED COUNT: 12.7 % (ref 10–15)
HCT VFR BLD AUTO: 35.7 % (ref 35–47)
HGB BLD-MCNC: 12.1 G/DL (ref 11.7–15.7)
MCH RBC QN AUTO: 32.1 PG (ref 26.5–33)
MCHC RBC AUTO-ENTMCNC: 33.9 G/DL (ref 31.5–36.5)
MCV RBC AUTO: 95 FL (ref 78–100)
PLATELET # BLD AUTO: 239 10E9/L (ref 150–450)
RBC # BLD AUTO: 3.77 10E12/L (ref 3.8–5.2)
WBC # BLD AUTO: 24.1 10E9/L (ref 4–11)

## 2017-07-25 PROCEDURE — 12000000 ZZH R&B MED SURG/OB

## 2017-07-25 PROCEDURE — 99221 1ST HOSP IP/OBS SF/LOW 40: CPT | Performed by: SURGERY

## 2017-07-25 PROCEDURE — 25800025 ZZH RX 258: Performed by: HOSPITALIST

## 2017-07-25 PROCEDURE — 25800025 ZZH RX 258: Performed by: INTERNAL MEDICINE

## 2017-07-25 PROCEDURE — 25000128 H RX IP 250 OP 636: Performed by: HOSPITALIST

## 2017-07-25 PROCEDURE — 99232 SBSQ HOSP IP/OBS MODERATE 35: CPT | Performed by: HOSPITALIST

## 2017-07-25 PROCEDURE — 85027 COMPLETE CBC AUTOMATED: CPT | Performed by: INTERNAL MEDICINE

## 2017-07-25 PROCEDURE — 25000128 H RX IP 250 OP 636: Performed by: INTERNAL MEDICINE

## 2017-07-25 PROCEDURE — 25000125 ZZHC RX 250: Performed by: INTERNAL MEDICINE

## 2017-07-25 PROCEDURE — 99207 ZZC CDG-MDM COMPONENT: MEETS LOW - DOWN CODED: CPT | Performed by: HOSPITALIST

## 2017-07-25 PROCEDURE — 36415 COLL VENOUS BLD VENIPUNCTURE: CPT | Performed by: INTERNAL MEDICINE

## 2017-07-25 RX ADMIN — POTASSIUM CHLORIDE, DEXTROSE MONOHYDRATE AND SODIUM CHLORIDE: 150; 5; 450 INJECTION, SOLUTION INTRAVENOUS at 17:03

## 2017-07-25 RX ADMIN — ACETAMINOPHEN 1000 MG: 10 INJECTION, SOLUTION INTRAVENOUS at 13:35

## 2017-07-25 RX ADMIN — TAZOBACTAM SODIUM AND PIPERACILLIN SODIUM 3.38 G: 375; 3 INJECTION, SOLUTION INTRAVENOUS at 23:00

## 2017-07-25 RX ADMIN — TAZOBACTAM SODIUM AND PIPERACILLIN SODIUM 3.38 G: 375; 3 INJECTION, SOLUTION INTRAVENOUS at 10:36

## 2017-07-25 RX ADMIN — POTASSIUM CHLORIDE, DEXTROSE MONOHYDRATE AND SODIUM CHLORIDE 1000 ML: 150; 5; 450 INJECTION, SOLUTION INTRAVENOUS at 00:01

## 2017-07-25 RX ADMIN — ACETAMINOPHEN 1000 MG: 10 INJECTION, SOLUTION INTRAVENOUS at 06:36

## 2017-07-25 RX ADMIN — ACETAMINOPHEN 1000 MG: 10 INJECTION, SOLUTION INTRAVENOUS at 21:48

## 2017-07-25 RX ADMIN — ACETAMINOPHEN 1000 MG: 10 INJECTION, SOLUTION INTRAVENOUS at 00:01

## 2017-07-25 RX ADMIN — HYDROMORPHONE HYDROCHLORIDE 0.5 MG: 1 INJECTION, SOLUTION INTRAMUSCULAR; INTRAVENOUS; SUBCUTANEOUS at 11:25

## 2017-07-25 RX ADMIN — HYDROMORPHONE HYDROCHLORIDE 0.5 MG: 1 INJECTION, SOLUTION INTRAMUSCULAR; INTRAVENOUS; SUBCUTANEOUS at 17:21

## 2017-07-25 RX ADMIN — POTASSIUM CHLORIDE, DEXTROSE MONOHYDRATE AND SODIUM CHLORIDE 1000 ML: 150; 5; 450 INJECTION, SOLUTION INTRAVENOUS at 06:54

## 2017-07-25 RX ADMIN — TAZOBACTAM SODIUM AND PIPERACILLIN SODIUM 3.38 G: 375; 3 INJECTION, SOLUTION INTRAVENOUS at 15:57

## 2017-07-25 RX ADMIN — HYDROMORPHONE HYDROCHLORIDE 0.5 MG: 1 INJECTION, SOLUTION INTRAMUSCULAR; INTRAVENOUS; SUBCUTANEOUS at 06:21

## 2017-07-25 ASSESSMENT — PAIN DESCRIPTION - DESCRIPTORS
DESCRIPTORS: CONSTANT
DESCRIPTORS: CONSTANT;RADIATING

## 2017-07-25 NOTE — PLAN OF CARE
Problem: Goal Outcome Summary  Goal: Goal Outcome Summary  Temp max 102.0, temp 98.9 after ofirmev was given.  Bowel sounds present, passing a small amount of flatus per report. Tolerating a few sips overnight, intermittent nausea, declined medication. Dilaudid last given at 2330.

## 2017-07-25 NOTE — PROGRESS NOTES
[facility]    Hospitalist Progress Note  Name: Jennifer Payne    MRN: [id number]  Provider:  Deejay Finnegan DO, MPH  Date of Service: 07/25/2017    Summary of Stay: Jennifer Payne is a 57 year old female with no significant medical history admitted on 7/24/2017 with abdominal pain found to have a 6 cm jejunal diverticulitis.      Problem List:   1. 6 cm jejunal diverticulitis: Continue IV abx with Zosyn. Appreciate surgery consult. Hopefully will resolve with conservative management. CLD, IVF, pain/nausea control. NPO after midnight in the event that she worsens and requires surgical intervention.  2. Leukocytosis: Repeat tomorrow. Likely from diverticulitis.     DVT Prophylaxis: Pneumatic Compression Devices and Ambulate every shift  Code Status: Full Code  Disposition: Expected discharge in 2-4 days to [facility]. Goals prior to discharge include IV abx, possible surgery, etc.   Family updated today: No     Interval History   Assumed care from previous hospitalist. The history was fully reviewed.  The patient reports doing well. No chest pain or shortness of breath. No nausea, vomiting, diarrhea, constipation. Some abdominal TTP. Still with fevers. No other specific complaints identified.     -Data reviewed today: I reviewed all new labs and imaging results over the last 24 hours.     Physical Exam   Temp: 100.5  F (38.1  C) Temp src: Oral BP: 108/67 Pulse: 97 Heart Rate: 91 Resp: 16 SpO2: 96 % O2 Device: None (Room air)    Vitals:    07/24/17 1021 07/24/17 1508   Weight: 77.1 kg (170 lb) 78.5 kg (173 lb 1.6 oz)     Vital Signs with Ranges  Temp:  [98.6  F (37  C)-102  F (38.9  C)] 100.5  F (38.1  C)  Pulse:  [88-98] 97  Heart Rate:  [86-91] 91  Resp:  [16-20] 16  BP: (108-146)/(62-91) 108/67  SpO2:  [95 %-99 %] 96 %  I/O last 3 completed shifts:  In: 2104 [P.O.:400; I.V.:1704]  Out: -     GENERAL: No apparent distress. Awake, alert, and fully oriented.  HEENT: Normocephalic, atraumatic. Extraocular  movements intact.  CARDIOVASCULAR: Regular rate and rhythm without murmurs or rubs. No S3.  PULMONARY: Clear bilaterally.  GASTROINTESTINAL: Soft, mildly tender, non-distended. Bowel sounds normoactive.   EXTREMITIES: No cyanosis or clubbing. No edema.  NEUROLOGICAL: CN 2-12 grossly intact, no focal neurological deficits.  DERMATOLOGICAL: No rash, ulcer, bruising, nor jaundice.     Medications     dextrose 5% and 0.45% NaCl + KCl 20 mEq/L 1,000 mL (07/25/17 0654)       ertapenem (INVanz) IV  1 g Intravenous Q24H     Data     Laboratory:    Recent Labs  Lab 07/25/17  0715 07/24/17  1113   WBC 24.1* 19.2*   HGB 12.1 14.0   HCT 35.7 41.5   MCV 95 93    291       Recent Labs  Lab 07/24/17  1113      POTASSIUM 3.6   CHLORIDE 101   CO2 29   ANIONGAP 5   *   BUN 10   CR 0.83   GFRESTIMATED 70   GFRESTBLACK 85   BRANDI 9.2     No results for input(s): CULT in the last 168 hours.    Imaging:  Recent Results (from the past 24 hour(s))   CT Abdomen Pelvis w Contrast    Narrative    CT ABDOMEN AND PELVIS WITH CONTRAST   7/24/2017 12:10 PM     HISTORY: Left lower quadrant pain. Failed outpatient management for  presumed diverticulitis.    COMPARISON: None.    TECHNIQUE: Following the uneventful administration of 85 mL Isovue-370  intravenous contrast, helical sections were acquired from the top of  the diaphragm through the pubic symphysis. Coronal reconstructions  were generated. Radiation dose for this scan was reduced using  automated exposure control, adjustment of the mA and/or kV according  to the patient's size, or iterative reconstruction technique.    FINDINGS:     Abdomen: Two subcentimeter low-attenuation lesions in the liver, too  small to characterize. The spleen, pancreas, adrenal glands and right  kidney are unremarkable. Several left renal peripelvic cysts. The  gallbladder is present. Small hiatal hernia. No enlarged lymph nodes  in the upper abdomen.    Scan through the lower chest is  unremarkable.    Pelvis: The small and large bowel are normal in caliber. The appendix  is likely visualized and unremarkable. Several diverticula are present  within the colon and also a few scattered within the small bowel. A 6  cm diverticulum is present in the jejunum in the upper left  hemiabdomen (series 2 image 34 and series 3 image 48). The jejunum at  this location is mildly thick-walled. Moderate edema is present within  the mesenteric fat about this diverticulum. These findings are  consistent with jejunal diverticulitis. No extraluminal gas or  loculated fluid collections in the abdomen or pelvis. The uterus is  not visualized. No enlarged lymph nodes in the pelvis. A very small  amount of free fluid in the pelvis.      Impression    IMPRESSION: Diverticulitis of a giant 6 cm diverticulum in the jejunum  in the upper left hemiabdomen. There is a moderate amount of edema  within the surrounding mesenteric fat. No visualized extraluminal gas  or abscess.    MD Deejay SANTOS DO MPH  [facility] Hospitalist  [address] [facility] [place] [address]  Pager: [phone]  07/25/2017

## 2017-07-25 NOTE — CONSULTS
"[facility] Surgery Consultation    Jennifer Payne MRN# [id number]   Age: 57 year old YOB: 1960     Date of Admission:  7/24/2017    Reason for consult: Jejunal diverticulitis       Requesting physician: Kain       Level of consult: Consult, follow and place orders           Assessment and Plan:   Assessment:     Patient Active Problem List    Diagnosis Date Noted     Acute diverticulitis 07/24/2017     Priority: Medium         Plan:   Continue antibiotic treatment for jejunal diverticulitis  Laparoscopic assisted/ open small bowel resection - discussed surgery - incision, infection, bleeding, recovery, recurrence.               Chief Complaint:   Jejunal diverticulitis     History is obtained from the patient and electronic health record         History of Present Illness:   This patient is a 57 year old  female with a significant past medical history of diverticulitis who presents with the following condition requiring a hospital admission: jejunal diverticulitis. Initially treated as typical colonic diverticulitis but due to slow initial response - she came to ER. Prior episodes involved colon as far as she knows.  Temps to 102 last night but now <101, pain improved/controlled (\"50% as bad as at admission).           Past Medical History:     Past Medical History:   Diagnosis Date     Diverticula of intestine              Past Surgical History:     Past Surgical History:   Procedure Laterality Date     GYN SURGERY      hysterectomy             Social History:     Social History   Substance Use Topics     Smoking status: Never Smoker     Smokeless tobacco: Never Used     Alcohol use Yes      Comment: \"not very much\"             Family History:   No family history on file.          Immunizations:     VACCINE/DOSE   Diptheria   DPT   DTAP   HBIG   Hepatitis A   Hepatitis B   HIB   Influenza   Measles   Meningococcal   MMR   Mumps   Pneumococcal   Polio   Rubella   Small Pox   TDAP "   Varicella   Zoster             Allergies:   No Known Allergies          Medications:     Current Facility-Administered Medications   Medication     HYDROmorphone (PF) (DILAUDID) injection 0.5 mg     naloxone (NARCAN) injection 0.1-0.4 mg     dextrose 5% and 0.45% NaCl + KCl 20 mEq/L infusion     HYDROmorphone (PF) (DILAUDID) injection 0.3-0.5 mg     zolpidem (AMBIEN) tablet 5 mg     ondansetron (ZOFRAN-ODT) ODT tab 4 mg    Or     ondansetron (ZOFRAN) injection 4 mg     prochlorperazine (COMPAZINE) injection 5-10 mg    Or     prochlorperazine (COMPAZINE) tablet 5-10 mg    Or     prochlorperazine (COMPAZINE) Suppository 25 mg     ertapenem (INVanz) 1 g vial to attach to  mL bag     acetaminophen (OFIRMEV) infusion 1,000 mg             Review of Systems:   C: NEGATIVE for fever, chills, change in weight  E/M: NEGATIVE for ear, mouth and throat problems  R: NEGATIVE for significant cough or SOB  CV: NEGATIVE for chest pain, palpitations or peripheral edema          Physical Exam:   All vitals have been reviewed  Patient Vitals for the past 24 hrs:   BP Temp Temp src Pulse Heart Rate Resp SpO2 Weight   07/25/17 0635 - - - - - 16 - -   07/25/17 0621 - - - - - 20 - -   07/25/17 0606 - 100.2  F (37.9  C) Oral - - - - -   07/25/17 0451 121/71 100.1  F (37.8  C) Oral - 86 20 98 % -   07/25/17 0228 - 98.9  F (37.2  C) Oral - - - - -   07/24/17 2358 125/71 101.2  F (38.4  C) Oral 97 - 18 96 % -   07/24/17 2335 - 102  F (38.9  C) Oral - - - - -   07/24/17 2202 - 99.9  F (37.7  C) Oral - - - - -   07/24/17 1952 122/72 100.6  F (38.1  C) Oral 95 - 18 97 % -   07/24/17 1759 - 100.2  F (37.9  C) Oral - - - - -   07/24/17 1538 111/62 101.9  F (38.8  C) Oral 93 - 20 99 % -   07/24/17 1508 - - - - - - - 78.5 kg (173 lb 1.6 oz)   07/24/17 1451 130/72 101.7  F (38.7  C) Oral 94 - 20 96 % -   07/24/17 1417 132/74 101.6  F (38.7  C) Oral 88 - 20 96 % -   07/24/17 1344 - - - - - - 98 % -   07/24/17 1343 - - - - - - 97 % -   07/24/17  1342 - - - - - - 97 % -   07/24/17 1341 - - - - - - 97 % -   07/24/17 1330 125/79 - - - - - - -   07/24/17 1315 138/88 - - - - - - -   07/24/17 1300 (!) 136/91 - - - - - - -   07/24/17 1245 127/77 - - - - - - -   07/24/17 1230 146/89 - - - - - 98 % -   07/24/17 1215 134/79 - - - - - 97 % -   07/24/17 1145 - - - - - - 96 % -   07/24/17 1130 134/78 - - - - - 95 % -   07/24/17 1115 132/75 - - - - - 99 % -   07/24/17 1021 124/80 98.6  F (37  C) Oral 98 - 20 99 % 77.1 kg (170 lb)       Intake/Output Summary (Last 24 hours) at 07/25/17 0820  Last data filed at 07/25/17 0455   Gross per 24 hour   Intake             2104 ml   Output                0 ml   Net             2104 ml     Neck:   supple, symmetrical, trachea midline, skin normal and no stridor     Chest / Breast:   Nl resp effort     Abdomen:   scars noted hypogastric vertical, soft, non-distended, tenderness noted left mid abdomen, voluntary guarding absent and no masses palpated                   Data:   All laboratory data reviewedResults for orders placed or performed during the hospital encounter of 07/24/17   CT Abdomen Pelvis w Contrast    Narrative    CT ABDOMEN AND PELVIS WITH CONTRAST   7/24/2017 12:10 PM     HISTORY: Left lower quadrant pain. Failed outpatient management for  presumed diverticulitis.    COMPARISON: None.    TECHNIQUE: Following the uneventful administration of 85 mL Isovue-370  intravenous contrast, helical sections were acquired from the top of  the diaphragm through the pubic symphysis. Coronal reconstructions  were generated. Radiation dose for this scan was reduced using  automated exposure control, adjustment of the mA and/or kV according  to the patient's size, or iterative reconstruction technique.    FINDINGS:     Abdomen: Two subcentimeter low-attenuation lesions in the liver, too  small to characterize. The spleen, pancreas, adrenal glands and right  kidney are unremarkable. Several left renal peripelvic cysts.  The  gallbladder is present. Small hiatal hernia. No enlarged lymph nodes  in the upper abdomen.    Scan through the lower chest is unremarkable.    Pelvis: The small and large bowel are normal in caliber. The appendix  is likely visualized and unremarkable. Several diverticula are present  within the colon and also a few scattered within the small bowel. A 6  cm diverticulum is present in the jejunum in the upper left  hemiabdomen (series 2 image 34 and series 3 image 48). The jejunum at  this location is mildly thick-walled. Moderate edema is present within  the mesenteric fat about this diverticulum. These findings are  consistent with jejunal diverticulitis. No extraluminal gas or  loculated fluid collections in the abdomen or pelvis. The uterus is  not visualized. No enlarged lymph nodes in the pelvis. A very small  amount of free fluid in the pelvis.      Impression    IMPRESSION: Diverticulitis of a giant 6 cm diverticulum in the jejunum  in the upper left hemiabdomen. There is a moderate amount of edema  within the surrounding mesenteric fat. No visualized extraluminal gas  or abscess.    GIA VERMA MD   CBC + differential   Result Value Ref Range    WBC 19.2 (H) 4.0 - 11.0 10e9/L    RBC Count 4.45 3.8 - 5.2 10e12/L    Hemoglobin 14.0 11.7 - 15.7 g/dL    Hematocrit 41.5 35.0 - 47.0 %    MCV 93 78 - 100 fl    MCH 31.5 26.5 - 33.0 pg    MCHC 33.7 31.5 - 36.5 g/dL    RDW 12.4 10.0 - 15.0 %    Platelet Count 291 150 - 450 10e9/L    Diff Method Automated Method     % Neutrophils 84.7 %    % Lymphocytes 6.2 %    % Monocytes 8.1 %    % Eosinophils 0.1 %    % Basophils 0.3 %    % Immature Granulocytes 0.6 %    Nucleated RBCs 0 0 /100    Absolute Neutrophil 16.2 (H) 1.6 - 8.3 10e9/L    Absolute Lymphocytes 1.2 0.8 - 5.3 10e9/L    Absolute Monocytes 1.6 (H) 0.0 - 1.3 10e9/L    Absolute Eosinophils 0.0 0.0 - 0.7 10e9/L    Absolute Basophils 0.1 0.0 - 0.2 10e9/L    Abs Immature Granulocytes 0.1 0 - 0.4 10e9/L     Absolute Nucleated RBC 0.0    Basic metabolic panel (BMP)   Result Value Ref Range    Sodium 135 133 - 144 mmol/L    Potassium 3.6 3.4 - 5.3 mmol/L    Chloride 101 94 - 109 mmol/L    Carbon Dioxide 29 20 - 32 mmol/L    Anion Gap 5 3 - 14 mmol/L    Glucose 134 (H) 70 - 99 mg/dL    Urea Nitrogen 10 7 - 30 mg/dL    Creatinine 0.83 0.52 - 1.04 mg/dL    GFR Estimate 70 >60 mL/min/1.7m2    GFR Estimate If Black 85 >60 mL/min/1.7m2    Calcium 9.2 8.5 - 10.1 mg/dL   UA with Microscopic   Result Value Ref Range    Color Urine Yellow     Appearance Urine Clear     Glucose Urine Negative NEG mg/dL    Bilirubin Urine Negative NEG    Ketones Urine Negative NEG mg/dL    Specific Gravity Urine 1.024 1.003 - 1.035    Blood Urine Negative NEG    pH Urine 6.0 5.0 - 7.0 pH    Protein Albumin Urine 30 (A) NEG mg/dL    Urobilinogen mg/dL 0.0 0.0 - 2.0 mg/dL    Nitrite Urine Negative NEG    Leukocyte Esterase Urine Trace (A) NEG    Source Midstream Urine     WBC Urine 1 0 - 2 /HPF    RBC Urine 2 0 - 2 /HPF    Bacteria Urine Moderate (A) NEG /HPF    Squamous Epithelial /HPF Urine 1 0 - 1 /HPF    Mucous Urine Present (A) NEG /LPF   Lactic acid level STAT   Result Value Ref Range    Lactic Acid 1.3 0.7 - 2.1 mmol/L   CBC with platelets   Result Value Ref Range    WBC 24.1 (H) 4.0 - 11.0 10e9/L    RBC Count 3.77 (L) 3.8 - 5.2 10e12/L    Hemoglobin 12.1 11.7 - 15.7 g/dL    Hematocrit 35.7 35.0 - 47.0 %    MCV 95 78 - 100 fl    MCH 32.1 26.5 - 33.0 pg    MCHC 33.9 31.5 - 36.5 g/dL    RDW 12.7 10.0 - 15.0 %    Platelet Count 239 150 - 450 10e9/L          Attestation:  I have reviewed today's vital signs, notes, medications, labs and imaging.  Amount of time performed on this consult: 45 minutes.    Pj Lenz MD

## 2017-07-25 NOTE — PLAN OF CARE
Problem: Goal Outcome Summary  Goal: Goal Outcome Summary  Outcome: No Change  Tmax 101.9, IV ofirmev administered. Temp currently 99.9. Reports abdominal pain rated at 5-6/10, declining pain medications for most of the night. One dose of 0.3 mg IV dilaudid administered with slight decrease in pain. Denies any nausea. Voiding adequate amount, denies any nausea.

## 2017-07-25 NOTE — PLAN OF CARE
Problem: Goal Outcome Summary  Goal: Goal Outcome Summary  Outcome: No Change  Ambulatory Status:  Pt up independently   VS: T max 101.2; tylenol given x1  Pain:  Abdominal pain controlled with IV dilaudid x1- relief  Resp: LS clear  GI:  Denies nausea.  Clear liquid diet.  BS hypoactive. + Passing flatus- small amounts.  Last BM 7/22.  :  WDL  Skin:  WDL, pale  Tx:  IV zosynl; monitoring temp/WBC  Labs:  WBC 24.1  Consults:  Surgery   Disposition:  TBD

## 2017-07-26 LAB
ANION GAP SERPL CALCULATED.3IONS-SCNC: 4 MMOL/L (ref 3–14)
BASOPHILS # BLD AUTO: 0 10E9/L (ref 0–0.2)
BASOPHILS NFR BLD AUTO: 0.2 %
BUN SERPL-MCNC: 7 MG/DL (ref 7–30)
CALCIUM SERPL-MCNC: 8.6 MG/DL (ref 8.5–10.1)
CHLORIDE SERPL-SCNC: 106 MMOL/L (ref 94–109)
CO2 SERPL-SCNC: 27 MMOL/L (ref 20–32)
CREAT SERPL-MCNC: 0.77 MG/DL (ref 0.52–1.04)
DIFFERENTIAL METHOD BLD: ABNORMAL
EOSINOPHIL # BLD AUTO: 0 10E9/L (ref 0–0.7)
EOSINOPHIL NFR BLD AUTO: 0.1 %
ERYTHROCYTE [DISTWIDTH] IN BLOOD BY AUTOMATED COUNT: 12.7 % (ref 10–15)
GFR SERPL CREATININE-BSD FRML MDRD: 77 ML/MIN/1.7M2
GLUCOSE SERPL-MCNC: 126 MG/DL (ref 70–99)
HCT VFR BLD AUTO: 35.9 % (ref 35–47)
HGB BLD-MCNC: 12 G/DL (ref 11.7–15.7)
IMM GRANULOCYTES # BLD: 0.2 10E9/L (ref 0–0.4)
IMM GRANULOCYTES NFR BLD: 1.1 %
LYMPHOCYTES # BLD AUTO: 1.3 10E9/L (ref 0.8–5.3)
LYMPHOCYTES NFR BLD AUTO: 6.8 %
MCH RBC QN AUTO: 31.8 PG (ref 26.5–33)
MCHC RBC AUTO-ENTMCNC: 33.4 G/DL (ref 31.5–36.5)
MCV RBC AUTO: 95 FL (ref 78–100)
MONOCYTES # BLD AUTO: 1.2 10E9/L (ref 0–1.3)
MONOCYTES NFR BLD AUTO: 6.3 %
NEUTROPHILS # BLD AUTO: 16.5 10E9/L (ref 1.6–8.3)
NEUTROPHILS NFR BLD AUTO: 85.5 %
NRBC # BLD AUTO: 0 10*3/UL
NRBC BLD AUTO-RTO: 0 /100
PLATELET # BLD AUTO: 238 10E9/L (ref 150–450)
POTASSIUM SERPL-SCNC: 4.2 MMOL/L (ref 3.4–5.3)
RBC # BLD AUTO: 3.77 10E12/L (ref 3.8–5.2)
SODIUM SERPL-SCNC: 137 MMOL/L (ref 133–144)
WBC # BLD AUTO: 19.3 10E9/L (ref 4–11)

## 2017-07-26 PROCEDURE — 85025 COMPLETE CBC W/AUTO DIFF WBC: CPT | Performed by: SURGERY

## 2017-07-26 PROCEDURE — 80048 BASIC METABOLIC PNL TOTAL CA: CPT | Performed by: SURGERY

## 2017-07-26 PROCEDURE — 99232 SBSQ HOSP IP/OBS MODERATE 35: CPT | Performed by: SURGERY

## 2017-07-26 PROCEDURE — 25000128 H RX IP 250 OP 636: Performed by: HOSPITALIST

## 2017-07-26 PROCEDURE — 25800025 ZZH RX 258: Performed by: HOSPITALIST

## 2017-07-26 PROCEDURE — 25000125 ZZHC RX 250: Performed by: INTERNAL MEDICINE

## 2017-07-26 PROCEDURE — 99232 SBSQ HOSP IP/OBS MODERATE 35: CPT | Performed by: HOSPITALIST

## 2017-07-26 PROCEDURE — 99207 ZZC CDG-MDM COMPONENT: MEETS LOW - DOWN CODED: CPT | Performed by: HOSPITALIST

## 2017-07-26 PROCEDURE — 12000000 ZZH R&B MED SURG/OB

## 2017-07-26 PROCEDURE — 36415 COLL VENOUS BLD VENIPUNCTURE: CPT | Performed by: SURGERY

## 2017-07-26 RX ADMIN — POTASSIUM CHLORIDE, DEXTROSE MONOHYDRATE AND SODIUM CHLORIDE: 150; 5; 450 INJECTION, SOLUTION INTRAVENOUS at 03:29

## 2017-07-26 RX ADMIN — ACETAMINOPHEN 1000 MG: 10 INJECTION, SOLUTION INTRAVENOUS at 15:58

## 2017-07-26 RX ADMIN — ACETAMINOPHEN 1000 MG: 10 INJECTION, SOLUTION INTRAVENOUS at 08:20

## 2017-07-26 RX ADMIN — TAZOBACTAM SODIUM AND PIPERACILLIN SODIUM 3.38 G: 375; 3 INJECTION, SOLUTION INTRAVENOUS at 16:59

## 2017-07-26 RX ADMIN — TAZOBACTAM SODIUM AND PIPERACILLIN SODIUM 3.38 G: 375; 3 INJECTION, SOLUTION INTRAVENOUS at 05:02

## 2017-07-26 RX ADMIN — POTASSIUM CHLORIDE, DEXTROSE MONOHYDRATE AND SODIUM CHLORIDE: 150; 5; 450 INJECTION, SOLUTION INTRAVENOUS at 14:15

## 2017-07-26 RX ADMIN — TAZOBACTAM SODIUM AND PIPERACILLIN SODIUM 3.38 G: 375; 3 INJECTION, SOLUTION INTRAVENOUS at 11:10

## 2017-07-26 RX ADMIN — TAZOBACTAM SODIUM AND PIPERACILLIN SODIUM 3.38 G: 375; 3 INJECTION, SOLUTION INTRAVENOUS at 22:38

## 2017-07-26 ASSESSMENT — PAIN DESCRIPTION - DESCRIPTORS
DESCRIPTORS: ACHING
DESCRIPTORS: ACHING

## 2017-07-26 NOTE — PROGRESS NOTES
[facility]  General Surgery Progress Note           Assessment and Plan:   Assessment:   Clinical and lab improvement      Plan:   -clear liquids slowly  Continue IVantibiiotics         Interval History:   no complaints and less pain         Physical Exam:   Blood pressure 125/73, pulse 97, temperature 99.7  F (37.6  C), temperature source Oral, resp. rate 20, weight 78.5 kg (173 lb 1.6 oz), SpO2 97 %.    I/O last 3 completed shifts:  In: 3358 [P.O.:590; I.V.:2768]  Out: -     Abdomen:   soft, non-distended, tenderness noted left mid abdomen, mild voluntary guarding present and no masses palpated               Data:     Recent Labs   Lab Test  07/26/17   0633  07/25/17   0715  07/24/17   1113   HGB  12.0  12.1  14.0   WBC  19.3*  24.1*  19.2*     Pj Lenz MD

## 2017-07-26 NOTE — PROGRESS NOTES
[facility]    Hospitalist Progress Note  Name: Jennifer Payne    MRN: [id number]  Provider:  Deejay Finnegan DO, MPH  Date of Service: 07/26/2017    Summary of Stay: Jennifer Payne is a 57 year old female with no significant medical history admitted on 7/24/2017 with abdominal pain found to have a 6 cm jejunal diverticulitis.      Problem List:   1. 6 cm jejunal diverticulitis: Continue IV abx with Zosyn. Appreciate surgery consult. Hopefully will resolve with conservative management. CLD, IVF, pain/nausea control. Currently NPO until surgery re-evaluation but can likely restart CLD afterward.  2. Leukocytosis: Repeat tomorrow. Likely from diverticulitis. Improved.    DVT Prophylaxis: Pneumatic Compression Devices and Ambulate every shift  Code Status: Full Code  Disposition: Expected discharge in 2-3 days to [facility]. Goals prior to discharge include IV abx, ADAT, etc.   Family updated today: Yes,  at bedside     Interval History   The patient reports doing well. No chest pain or shortness of breath. Mild abdominal pain. No nausea, vomiting, diarrhea, constipation. Still low grade fevers. No other specific complaints identified.     -Data reviewed today: I reviewed all new labs and imaging results over the last 24 hours.     Physical Exam   Temp: 99.7  F (37.6  C) Temp src: Oral BP: 125/73   Heart Rate: 98 Resp: 20 SpO2: 97 % O2 Device: None (Room air)    Vitals:    07/24/17 1021 07/24/17 1508   Weight: 77.1 kg (170 lb) 78.5 kg (173 lb 1.6 oz)     Vital Signs with Ranges  Temp:  [98.7  F (37.1  C)-101.2  F (38.4  C)] 99.7  F (37.6  C)  Heart Rate:  [92-98] 98  Resp:  [16-20] 20  BP: (104-125)/(59-73) 125/73  SpO2:  [95 %-97 %] 97 %  I/O last 3 completed shifts:  In: 3358 [P.O.:590; I.V.:2768]  Out: -     GENERAL: No apparent distress. Awake, alert, and fully oriented.  HEENT: Normocephalic, atraumatic. Extraocular movements intact.  CARDIOVASCULAR: Regular rate and rhythm without murmurs or  rubs. No S3.  PULMONARY: Clear bilaterally.  GASTROINTESTINAL: Soft, mildly tender, non-distended. Bowel sounds normoactive.   EXTREMITIES: No cyanosis or clubbing. No edema.  NEUROLOGICAL: CN 2-12 grossly intact, no focal neurological deficits.  DERMATOLOGICAL: No rash, ulcer, bruising, nor jaundice.     Medications     dextrose 5% and 0.45% NaCl + KCl 20 mEq/L 100 mL/hr at 07/26/17 0329       piperacillin-tazobactam  3.375 g Intravenous Q6H     Data     Laboratory:    Recent Labs  Lab 07/26/17  0633 07/25/17  0715 07/24/17  1113   WBC 19.3* 24.1* 19.2*   HGB 12.0 12.1 14.0   HCT 35.9 35.7 41.5   MCV 95 95 93    239 291       Recent Labs  Lab 07/26/17  0633 07/24/17  1113    135   POTASSIUM 4.2 3.6   CHLORIDE 106 101   CO2 27 29   ANIONGAP 4 5   * 134*   BUN 7 10   CR 0.77 0.83   GFRESTIMATED 77 70   GFRESTBLACK >90African American GFR Calc 85   BRANDI 8.6 9.2     No results for input(s): CULT in the last 168 hours.    Imaging:  No results found for this or any previous visit (from the past 24 hour(s)).      Deejay Finnegan DO MPH  [facility] Hospitalist  [address] [place] [address]  Pager: [phone]  07/26/2017

## 2017-07-26 NOTE — PLAN OF CARE
Problem: Goal Outcome Summary  Goal: Goal Outcome Summary  Outcome: No Change  Pt up with SBA to independent, knows to call if feeling lightheaded/dizzy. Ambulated multiple times in hallway tonight. Rates pain in abdomen at 4-8/10, one dose of IV dilaudid administered. Tmax 100.3, IV ofirmev administered x1. Pt showered with chlorhexidine scrub. Denies any nausea.

## 2017-07-26 NOTE — PLAN OF CARE
Problem: Goal Outcome Summary  Goal: Goal Outcome Summary  Outcome: No Change  Max temp 99.3,tachy, other vital signs stable.  Denies any nausea.    C/O abdominal pain 4/10 declined pain medications.  Up independently, voiding without difficulty.   /hr. IV Zosyn  Consults: surgery

## 2017-07-26 NOTE — PLAN OF CARE
Problem: Goal Outcome Summary  Goal: Goal Outcome Summary  Outcome: No Change  VSS, AAOx4. Independent. LS-clear.  Hyperactive bowel sounds, bm this am. Tender on LUQ, LLQ but improved.  D5 1/2 NS +K @ 100ml/h.  NPO since midnight for sx consult.  Zosyn q6h.  Tylenol x1 for c/o general aches/malaise.  WBC down to 19.3 from 24.1.

## 2017-07-27 LAB
ANION GAP SERPL CALCULATED.3IONS-SCNC: 5 MMOL/L (ref 3–14)
BUN SERPL-MCNC: 6 MG/DL (ref 7–30)
CALCIUM SERPL-MCNC: 9 MG/DL (ref 8.5–10.1)
CHLORIDE SERPL-SCNC: 108 MMOL/L (ref 94–109)
CO2 SERPL-SCNC: 27 MMOL/L (ref 20–32)
CREAT SERPL-MCNC: 0.78 MG/DL (ref 0.52–1.04)
ERYTHROCYTE [DISTWIDTH] IN BLOOD BY AUTOMATED COUNT: 12.7 % (ref 10–15)
GFR SERPL CREATININE-BSD FRML MDRD: 76 ML/MIN/1.7M2
GLUCOSE SERPL-MCNC: 112 MG/DL (ref 70–99)
HCT VFR BLD AUTO: 33.9 % (ref 35–47)
HGB BLD-MCNC: 11.6 G/DL (ref 11.7–15.7)
MCH RBC QN AUTO: 32.2 PG (ref 26.5–33)
MCHC RBC AUTO-ENTMCNC: 34.2 G/DL (ref 31.5–36.5)
MCV RBC AUTO: 94 FL (ref 78–100)
PLATELET # BLD AUTO: 276 10E9/L (ref 150–450)
POTASSIUM SERPL-SCNC: 4.1 MMOL/L (ref 3.4–5.3)
RBC # BLD AUTO: 3.6 10E12/L (ref 3.8–5.2)
SODIUM SERPL-SCNC: 140 MMOL/L (ref 133–144)
WBC # BLD AUTO: 13.6 10E9/L (ref 4–11)

## 2017-07-27 PROCEDURE — 25000125 ZZHC RX 250: Performed by: INTERNAL MEDICINE

## 2017-07-27 PROCEDURE — 36415 COLL VENOUS BLD VENIPUNCTURE: CPT | Performed by: HOSPITALIST

## 2017-07-27 PROCEDURE — 25800025 ZZH RX 258: Performed by: HOSPITALIST

## 2017-07-27 PROCEDURE — 25000128 H RX IP 250 OP 636: Performed by: INTERNAL MEDICINE

## 2017-07-27 PROCEDURE — 12000000 ZZH R&B MED SURG/OB

## 2017-07-27 PROCEDURE — 25000132 ZZH RX MED GY IP 250 OP 250 PS 637: Performed by: INTERNAL MEDICINE

## 2017-07-27 PROCEDURE — 99231 SBSQ HOSP IP/OBS SF/LOW 25: CPT | Performed by: INTERNAL MEDICINE

## 2017-07-27 PROCEDURE — 85027 COMPLETE CBC AUTOMATED: CPT | Performed by: HOSPITALIST

## 2017-07-27 PROCEDURE — 80048 BASIC METABOLIC PNL TOTAL CA: CPT | Performed by: HOSPITALIST

## 2017-07-27 PROCEDURE — 25000128 H RX IP 250 OP 636: Performed by: HOSPITALIST

## 2017-07-27 RX ORDER — ACETAMINOPHEN 325 MG/1
650 TABLET ORAL EVERY 4 HOURS PRN
Status: DISCONTINUED | OUTPATIENT
Start: 2017-07-27 | End: 2017-07-29 | Stop reason: HOSPADM

## 2017-07-27 RX ORDER — OXYCODONE HYDROCHLORIDE 5 MG/1
5-10 TABLET ORAL
Status: DISCONTINUED | OUTPATIENT
Start: 2017-07-27 | End: 2017-07-29 | Stop reason: HOSPADM

## 2017-07-27 RX ORDER — CALCIUM CARBONATE 500 MG/1
500-1000 TABLET, CHEWABLE ORAL
Status: DISCONTINUED | OUTPATIENT
Start: 2017-07-27 | End: 2017-07-29 | Stop reason: HOSPADM

## 2017-07-27 RX ORDER — ACETAMINOPHEN 10 MG/ML
1000 INJECTION, SOLUTION INTRAVENOUS EVERY 6 HOURS PRN
Status: DISCONTINUED | OUTPATIENT
Start: 2017-07-27 | End: 2017-07-29 | Stop reason: HOSPADM

## 2017-07-27 RX ADMIN — ACETAMINOPHEN 1000 MG: 10 INJECTION, SOLUTION INTRAVENOUS at 15:14

## 2017-07-27 RX ADMIN — TAZOBACTAM SODIUM AND PIPERACILLIN SODIUM 3.38 G: 375; 3 INJECTION, SOLUTION INTRAVENOUS at 10:52

## 2017-07-27 RX ADMIN — HYDROMORPHONE HYDROCHLORIDE 0.3 MG: 1 INJECTION, SOLUTION INTRAMUSCULAR; INTRAVENOUS; SUBCUTANEOUS at 14:47

## 2017-07-27 RX ADMIN — ACETAMINOPHEN 1000 MG: 10 INJECTION, SOLUTION INTRAVENOUS at 04:41

## 2017-07-27 RX ADMIN — POTASSIUM CHLORIDE, DEXTROSE MONOHYDRATE AND SODIUM CHLORIDE: 150; 5; 450 INJECTION, SOLUTION INTRAVENOUS at 22:30

## 2017-07-27 RX ADMIN — TAZOBACTAM SODIUM AND PIPERACILLIN SODIUM 3.38 G: 375; 3 INJECTION, SOLUTION INTRAVENOUS at 22:30

## 2017-07-27 RX ADMIN — TAZOBACTAM SODIUM AND PIPERACILLIN SODIUM 3.38 G: 375; 3 INJECTION, SOLUTION INTRAVENOUS at 04:41

## 2017-07-27 RX ADMIN — POTASSIUM CHLORIDE, DEXTROSE MONOHYDRATE AND SODIUM CHLORIDE: 150; 5; 450 INJECTION, SOLUTION INTRAVENOUS at 00:22

## 2017-07-27 RX ADMIN — CALCIUM CARBONATE (ANTACID) CHEW TAB 500 MG 1000 MG: 500 CHEW TAB at 01:17

## 2017-07-27 RX ADMIN — POTASSIUM CHLORIDE, DEXTROSE MONOHYDRATE AND SODIUM CHLORIDE: 150; 5; 450 INJECTION, SOLUTION INTRAVENOUS at 10:52

## 2017-07-27 RX ADMIN — TAZOBACTAM SODIUM AND PIPERACILLIN SODIUM 3.38 G: 375; 3 INJECTION, SOLUTION INTRAVENOUS at 17:28

## 2017-07-27 ASSESSMENT — PAIN DESCRIPTION - DESCRIPTORS
DESCRIPTORS: ACHING
DESCRIPTORS: ACHING

## 2017-07-27 NOTE — PLAN OF CARE
Problem: Goal Outcome Summary  Goal: Goal Outcome Summary  Outcome: No Change  Ambulatory Status:  Pt up independently   VS: T max of 99.9; tylenol given x1  Pain:  No dilaudid used this shift; ofirmev x1  Resp: LS clear  GI:  Denies nausea.  Fair appetite and on clear liquid diet.  BS active.  Passing flatus.  Last BM 7/26.  :  WDL  Skin:  WDL  Tx:  IV zosyn  Labs:  WBC 19.3  Consults:  surgery  Disposition:  TBD

## 2017-07-27 NOTE — PROGRESS NOTES
[facility]  Hospitalist Progress Note  Sudeep Garcia, DO 07/27/2017    Reason for Stay (Diagnosis): Diverticulitis         Assessment and Plan:      Summary of Stay: Jennifer Payne is a 57 year old female admitted on 7/24/2017 with Diverticulitis.    Problem List:   1. Acute diverticulitis.  Continue IV Zosyn.  Advance diet to full liquids.  Pain meds PRN.    DVT Prophylaxis: Ambulate every shift  Code Status: Full Code  Discharge Dispo: Home  Estimated Disch Date / # of Days until Disch: 1-3        Interval History (Subjective):      Some abdominal pain.  Occasional nausea.  No CP, SOB, F/C.                  Physical Exam:      Last Vital Signs:  /71  Pulse 92  Temp 98.6  F (37  C) (Oral)  Resp 16  Wt 78.5 kg (173 lb 1.6 oz)  SpO2 95%    Gen:  NAD, A&Ox3.  Eyes:  PERRL, sclera anicteric.  OP:  MMM, no lesions.  Neck:  Supple.  CV:  Regular, no murmurs.  Lung:  CTA b/l, normal effort.  Ab:  +BS, soft.  Skin:  Warm, dry to touch.  No rash.  Ext:  No pitting edema LE b/l.           Medications:      All current medications were reviewed with changes reflected in problem list.         Data:      All new lab and imaging data was reviewed.   Labs:    Recent Labs  Lab 07/27/17  0720      POTASSIUM 4.1   CHLORIDE 108   CO2 27   ANIONGAP 5   *   BUN 6*   CR 0.78   GFRESTIMATED 76   GFRESTBLACK >90African American GFR Calc   BRANDI 9.0       Recent Labs  Lab 07/27/17  0720   WBC 13.6*   HGB 11.6*   HCT 33.9*   MCV 94         Imaging:   No results found for this or any previous visit (from the past 24 hour(s)).

## 2017-07-27 NOTE — PLAN OF CARE
Problem: Goal Outcome Summary  Goal: Goal Outcome Summary  Outcome: No Change  Tmax 99.5.  Other VSS.  Pain 6/10, IV ofirmev x1.  Tums x1 for heartburn.  Iv zosyn continues q6h.  Surgery following.  Will monitor.

## 2017-07-27 NOTE — PLAN OF CARE
Problem: Goal Outcome Summary  Goal: Goal Outcome Summary  Outcome: No Change  Patient remains hospitalized for diverticulitis. Continues on IV abx. Advanced to full liquid diet, but had increased pain afterwards - instructed to stick with clears for now. Denies nausea. IVF continue. Had small BM this AM. Voiding without difficulty. Bowel sounds hypoactive. Afebrile. Ambulating frequently in halls independently.

## 2017-07-27 NOTE — PROGRESS NOTES
[facility]   General Surgery Progress Note           Assessment and Plan:   Assessment:   Jejunal diverticulitis      Plan:   -advance to full liquid diet  -abx: IV Zosyn  -pain control:  Tylenol PRN.  Oxycodone available if needed  -possible DC home tomorrow with 2 weeks of PO abx.  F/u Dr. Lenz 10-14 days to discuss/plan surgery.         Interval History:   Afebrile.  Feels more energetic today with less abd pain.  Tolerating clear liquids.  + passing flatus and having loose stools.  Hoping to go home soon.         Physical Exam:   Blood pressure 119/71, pulse 92, temperature 98.6  F (37  C), temperature source Oral, resp. rate 16, weight 78.5 kg (173 lb 1.6 oz), SpO2 95 %.    I/O last 3 completed shifts:  In: 3191 [P.O.:560; I.V.:2631]  Out: -     Abdomen:   soft, mildly distended, mild tenderness noted in the left upper quadrant and normal bowel sounds           Data:     Recent Labs   Lab Test  07/27/17   0720  07/26/17   0633  07/25/17   0715   HGB  11.6*  12.0  12.1   WBC  13.6*  19.3*  24.1*       Gayathri Goddard PA-C     As above, significant recurrence of pain after full liquids so back to clears now and feels back to baseline.   Abdomen unchanged, mild tenderness in left mid abdomen, no peritonitis  Continue antibiotics  Recheck WBC in AM and re-assess possible need for immediate (rather than delayed ) surgery  Pj Lenz MD  7/27/2017 4:25 PM

## 2017-07-28 LAB
BASOPHILS # BLD AUTO: 0.1 10E9/L (ref 0–0.2)
BASOPHILS NFR BLD AUTO: 0.6 %
DIFFERENTIAL METHOD BLD: ABNORMAL
EOSINOPHIL # BLD AUTO: 0.1 10E9/L (ref 0–0.7)
EOSINOPHIL NFR BLD AUTO: 1.5 %
ERYTHROCYTE [DISTWIDTH] IN BLOOD BY AUTOMATED COUNT: 13 % (ref 10–15)
HCT VFR BLD AUTO: 34.2 % (ref 35–47)
HGB BLD-MCNC: 11.2 G/DL (ref 11.7–15.7)
IMM GRANULOCYTES # BLD: 0 10E9/L (ref 0–0.4)
IMM GRANULOCYTES NFR BLD: 0.4 %
LYMPHOCYTES # BLD AUTO: 1.9 10E9/L (ref 0.8–5.3)
LYMPHOCYTES NFR BLD AUTO: 23.7 %
MCH RBC QN AUTO: 30.9 PG (ref 26.5–33)
MCHC RBC AUTO-ENTMCNC: 32.7 G/DL (ref 31.5–36.5)
MCV RBC AUTO: 94 FL (ref 78–100)
MONOCYTES # BLD AUTO: 0.5 10E9/L (ref 0–1.3)
MONOCYTES NFR BLD AUTO: 6.7 %
NEUTROPHILS # BLD AUTO: 5.3 10E9/L (ref 1.6–8.3)
NEUTROPHILS NFR BLD AUTO: 67.1 %
NRBC # BLD AUTO: 0 10*3/UL
NRBC BLD AUTO-RTO: 0 /100
PLATELET # BLD AUTO: 322 10E9/L (ref 150–450)
RBC # BLD AUTO: 3.63 10E12/L (ref 3.8–5.2)
WBC # BLD AUTO: 7.9 10E9/L (ref 4–11)

## 2017-07-28 PROCEDURE — 85025 COMPLETE CBC W/AUTO DIFF WBC: CPT | Performed by: SURGERY

## 2017-07-28 PROCEDURE — 25800025 ZZH RX 258: Performed by: HOSPITALIST

## 2017-07-28 PROCEDURE — 36415 COLL VENOUS BLD VENIPUNCTURE: CPT | Performed by: SURGERY

## 2017-07-28 PROCEDURE — 99232 SBSQ HOSP IP/OBS MODERATE 35: CPT | Performed by: SURGERY

## 2017-07-28 PROCEDURE — 12000000 ZZH R&B MED SURG/OB

## 2017-07-28 PROCEDURE — 99231 SBSQ HOSP IP/OBS SF/LOW 25: CPT | Performed by: INTERNAL MEDICINE

## 2017-07-28 PROCEDURE — 25000128 H RX IP 250 OP 636: Performed by: HOSPITALIST

## 2017-07-28 RX ADMIN — TAZOBACTAM SODIUM AND PIPERACILLIN SODIUM 3.38 G: 375; 3 INJECTION, SOLUTION INTRAVENOUS at 10:43

## 2017-07-28 RX ADMIN — POTASSIUM CHLORIDE, DEXTROSE MONOHYDRATE AND SODIUM CHLORIDE: 150; 5; 450 INJECTION, SOLUTION INTRAVENOUS at 20:00

## 2017-07-28 RX ADMIN — TAZOBACTAM SODIUM AND PIPERACILLIN SODIUM 3.38 G: 375; 3 INJECTION, SOLUTION INTRAVENOUS at 05:07

## 2017-07-28 RX ADMIN — TAZOBACTAM SODIUM AND PIPERACILLIN SODIUM 3.38 G: 375; 3 INJECTION, SOLUTION INTRAVENOUS at 16:54

## 2017-07-28 RX ADMIN — TAZOBACTAM SODIUM AND PIPERACILLIN SODIUM 3.38 G: 375; 3 INJECTION, SOLUTION INTRAVENOUS at 22:47

## 2017-07-28 RX ADMIN — POTASSIUM CHLORIDE, DEXTROSE MONOHYDRATE AND SODIUM CHLORIDE: 150; 5; 450 INJECTION, SOLUTION INTRAVENOUS at 10:43

## 2017-07-28 NOTE — PLAN OF CARE
Problem: Goal Outcome Summary  Goal: Goal Outcome Summary  Outcome: No Change  Pt remains in hospital for diverticulitis. Pt alert/oriented, vitals stable, T-max 100.1-tylenol given recheck 99.6. Up independent, ambulating hallway. Pt c/o abdominal discomfort/pain-declined pain meds. New IV placed right lower forearm. D5 1/2NS+20KCl@100ml/hr infusing. IV Zosyn for abx. Tolerating clear liquid diet-pt had increase in pain w/ full liquids. Pt to be NPO @ midnight. Surgery following. Will continue to monitor and provide supportive care.

## 2017-07-28 NOTE — PROGRESS NOTES
[facility]  Hospitalist Progress Note  Sudeep Garcia, DO 07/28/2017    Reason for Stay (Diagnosis): Diverticulitis         Assessment and Plan:      Summary of Stay: Jennifer Payne is a 57 year old female admitted on 7/24/2017 with Diverticulitis.    Problem List:   1. Acute diverticulitis.  Continue IV Zosyn.  Advance diet to full liquids again today after she had mild trouble with advance yesterday.  Pain meds PRN.     DVT Prophylaxis: Ambulate every shift  Code Status: Full Code  Discharge Dispo: Home  Estimated Disch Date / # of Days until Disch: 1-2        Interval History (Subjective):      Minimal abdominal pain at times.  No CP, SOB, F/C, N/V, or diarrhea.                  Physical Exam:      Last Vital Signs:  /81  Pulse 81  Temp 98.7  F (37.1  C) (Oral)  Resp 18  Wt 78.5 kg (173 lb 1.6 oz)  SpO2 98%    Gen:  NAD, A&Ox3.  Eyes:  PERRL, sclera anicteric.  OP:  MMM, no lesions.  Neck:  Supple.  CV:  Regular, no murmurs.  Lung:  CTA b/l, normal effort.  Ab:  +BS, soft.  Skin:  Warm, dry to touch.  No rash.  Ext:  No pitting edema LE b/l.           Medications:      All current medications were reviewed with changes reflected in problem list.         Data:      All new lab and imaging data was reviewed.   Labs:    Recent Labs  Lab 07/27/17  0720      POTASSIUM 4.1   CHLORIDE 108   CO2 27   ANIONGAP 5   *   BUN 6*   CR 0.78   GFRESTIMATED 76   GFRESTBLACK >90African American GFR Calc   BRANDI 9.0       Recent Labs  Lab 07/28/17  0650   WBC 7.9   HGB 11.2*   HCT 34.2*   MCV 94         Imaging:   No results found for this or any previous visit (from the past 24 hour(s)).

## 2017-07-28 NOTE — PLAN OF CARE
Problem: Goal Outcome Summary  Goal: Goal Outcome Summary  Outcome: Improving  Tolerated clear liquids and had full liquids 1 hr. Ago. 1 BM, denies pain or nausea. Ambulating in halls.

## 2017-07-28 NOTE — PLAN OF CARE
Problem: Goal Outcome Summary  Goal: Goal Outcome Summary  Outcome: No Change  Up in room independently. No complaints of pain or nausea. NPO at midnight. Following with surgery. Zosyn given per orders.

## 2017-07-28 NOTE — PROGRESS NOTES
[facility]   General Surgery Progress Note           Assessment and Plan:   Assessment:   Jejunal diverticulitis  Ileus, resolving      Plan:   -re-start clear liquid diet, ADAT to full liquids today  -abx: IV Zosyn  -pain control:  Tylenol PRN.  Oxycodone available if needed  -possible DC home tomorrow with 2 weeks of PO abx.  F/u Dr. Lenz 10-14 days to discuss/plan surgery.         Interval History:   Afebrile today, Tmax 100.1 yesterday afternoon.  NPO since yesterday evening due to abd discomfort after eating.  No abd pain this am, but nervous to try eating again.  Denies bloating.  + passing flatus.  Last BM yesterday am.         Physical Exam:   Blood pressure 126/81, pulse 81, temperature 98.7  F (37.1  C), temperature source Oral, resp. rate 18, weight 78.5 kg (173 lb 1.6 oz), SpO2 98 %.    I/O last 3 completed shifts:  In: 2835 [P.O.:480; I.V.:2355]  Out: -     Abdomen:   soft, mildly distended, non-tender and normal bowel sounds           Data:       Recent Labs  Lab 07/28/17  0650 07/27/17  0720 07/26/17  0633   HGB 11.2* 11.6* 12.0       Recent Labs  Lab 07/28/17  0650 07/27/17  0720 07/26/17  0633   WBC 7.9 13.6* 19.3*       Gayathri Goddard PA-C       as above, feels well but apprehensive re: advancing diet  Nl WBC, fever gone  Abdomen soft and non tender  Plan: advance diet - if erin well, home tomorrow with 2 weeks PO ABX and see me in office in about 10 days. Then surgery at about 6 weeks.   Pj Lenz MD  7/28/2017 1:53 PM

## 2017-07-29 VITALS
WEIGHT: 173.1 LBS | TEMPERATURE: 98.6 F | OXYGEN SATURATION: 98 % | SYSTOLIC BLOOD PRESSURE: 128 MMHG | HEART RATE: 81 BPM | RESPIRATION RATE: 16 BRPM | DIASTOLIC BLOOD PRESSURE: 79 MMHG

## 2017-07-29 PROCEDURE — 25800025 ZZH RX 258: Performed by: HOSPITALIST

## 2017-07-29 PROCEDURE — 25000128 H RX IP 250 OP 636: Performed by: HOSPITALIST

## 2017-07-29 PROCEDURE — 99239 HOSP IP/OBS DSCHRG MGMT >30: CPT | Performed by: INTERNAL MEDICINE

## 2017-07-29 RX ADMIN — TAZOBACTAM SODIUM AND PIPERACILLIN SODIUM 3.38 G: 375; 3 INJECTION, SOLUTION INTRAVENOUS at 11:27

## 2017-07-29 RX ADMIN — TAZOBACTAM SODIUM AND PIPERACILLIN SODIUM 3.38 G: 375; 3 INJECTION, SOLUTION INTRAVENOUS at 05:32

## 2017-07-29 RX ADMIN — POTASSIUM CHLORIDE, DEXTROSE MONOHYDRATE AND SODIUM CHLORIDE: 150; 5; 450 INJECTION, SOLUTION INTRAVENOUS at 07:19

## 2017-07-29 NOTE — DISCHARGE SUMMARY
Discharge Summary  Hospitalist Service    Jennifer Payne MRN# [id number]   YOB: 1960 Age: 57 year old     Date of Admission:  7/24/2017  Date of Discharge:  7/29/2017  Admitting Physician:  Jesus Sosa MD  Discharge Physician: Sudeep Garcia DO  Discharging Service: Hospitalist Service     Primary Provider: [facility], [place]  Primary Care Physician Phone Number: [phone]         Discharge Diagnoses/Problem Oriented Hospital Course (Providers):    Jennifer Payne was admitted on 7/24/2017 by Jesus Sosa MD and I would refer you to their history and physical.  The following problems were addressed during her hospitalization:  1. Acute diverticulitis.  Was on IV Zosyn during hospital stay.  Symptoms improved significantly.  Surgery has been following.  Continue Augmentin in outpatient setting for 2 more weeks and then follow up with Surgery.  Low fiber diet.             Code Status:      Full Code          Pending Results:        Unresulted Labs Ordered in the Past 30 Days of this Admission     No orders found from 5/25/2017 to 7/25/2017.            Discharge Instructions and Follow-Up:      Follow-up Appointments     Follow-up and recommended labs and tests        Follow up with primary care provider, [place] [facility], within 7   days for hospital follow- up.  Follow up with General Surgery within 2   weeks.                      Discharge Disposition:      Discharged to home         Discharge Medications:        Current Discharge Medication List      START taking these medications    Details   amoxicillin-clavulanate (AUGMENTIN) 875-125 MG per tablet Take 1 tablet by mouth 2 times daily for 14 days  Qty: 28 tablet, Refills: 0    Associated Diagnoses: Diverticulitis, jejunum         CONTINUE these medications which have NOT CHANGED    Details   Vitamin D, Cholecalciferol, 1000 UNITS CAPS Take 1,000 Units by mouth daily      multivitamin, therapeutic (THERA-VIT) TABS  tablet Take 1 tablet by mouth daily         STOP taking these medications       ciprofloxacin (CIPRO) 500 MG tablet Comments:   Reason for Stopping:         metroNIDAZOLE (FLAGYL) 500 MG tablet Comments:   Reason for Stopping:                 Allergies:       No Known Allergies        Condition and Physical on Discharge:      Discharge condition: Stable   Vitals: Blood pressure 128/79, pulse 81, temperature 98.6  F (37  C), temperature source Oral, resp. rate 16, weight 78.5 kg (173 lb 1.6 oz), SpO2 98 %.     Gen:  NAD, A&Ox3.  Eyes:  PERRL, sclera anicteric.  OP:  MMM, no lesions.  Neck:  Supple.  CV:  Regular, no murmurs.  Lung:  CTA b/l, normal effort.  Ab:  +BS, soft.  Skin:  Warm, dry to touch.  No rash.  Ext:  No pitting edema LE b/l.          Discharge Time:      Spent 35 minutes with patient and working on discharged on 7/29/17.        Image Results From This Hospital Stay (For Non-EPIC Providers):        Results for orders placed or performed during the hospital encounter of 07/24/17   CT Abdomen Pelvis w Contrast    Narrative    CT ABDOMEN AND PELVIS WITH CONTRAST   7/24/2017 12:10 PM     HISTORY: Left lower quadrant pain. Failed outpatient management for  presumed diverticulitis.    COMPARISON: None.    TECHNIQUE: Following the uneventful administration of 85 mL Isovue-370  intravenous contrast, helical sections were acquired from the top of  the diaphragm through the pubic symphysis. Coronal reconstructions  were generated. Radiation dose for this scan was reduced using  automated exposure control, adjustment of the mA and/or kV according  to the patient's size, or iterative reconstruction technique.    FINDINGS:     Abdomen: Two subcentimeter low-attenuation lesions in the liver, too  small to characterize. The spleen, pancreas, adrenal glands and right  kidney are unremarkable. Several left renal peripelvic cysts. The  gallbladder is present. Small hiatal hernia. No enlarged lymph nodes  in the upper  abdomen.    Scan through the lower chest is unremarkable.    Pelvis: The small and large bowel are normal in caliber. The appendix  is likely visualized and unremarkable. Several diverticula are present  within the colon and also a few scattered within the small bowel. A 6  cm diverticulum is present in the jejunum in the upper left  hemiabdomen (series 2 image 34 and series 3 image 48). The jejunum at  this location is mildly thick-walled. Moderate edema is present within  the mesenteric fat about this diverticulum. These findings are  consistent with jejunal diverticulitis. No extraluminal gas or  loculated fluid collections in the abdomen or pelvis. The uterus is  not visualized. No enlarged lymph nodes in the pelvis. A very small  amount of free fluid in the pelvis.      Impression    IMPRESSION: Diverticulitis of a giant 6 cm diverticulum in the jejunum  in the upper left hemiabdomen. There is a moderate amount of edema  within the surrounding mesenteric fat. No visualized extraluminal gas  or abscess.    GIA VERMA MD           Most Recent Lab Results In EPIC (For Non-EPIC Providers):    Most Recent 3 CBC's:  Recent Labs   Lab Test  07/28/17   0650  07/27/17   0720  07/26/17   0633   WBC  7.9  13.6*  19.3*   HGB  11.2*  11.6*  12.0   MCV  94  94  95   PLT  322  276  238      Most Recent 3 BMP's:  Recent Labs   Lab Test  07/27/17   0720  07/26/17   0633  07/24/17   1113   NA  140  137  135   POTASSIUM  4.1  4.2  3.6   CHLORIDE  108  106  101   CO2  27  27  29   BUN  6*  7  10   CR  0.78  0.77  0.83   ANIONGAP  5  4  5   BRANDI  9.0  8.6  9.2   GLC  112*  126*  134*     Most Recent 3 Troponin's:No lab results found.  Most Recent 3 INR's:No lab results found.  Most Recent 2 LFT's:No lab results found.  Most Recent Cholesterol Panel:No lab results found.  Most Recent 6 Bacteria Isolates From Any Culture (See EPIC Reports for Culture Details):No lab results found.  Most Recent TSH, T4 and HgbA1c: No lab results  found.

## 2017-07-29 NOTE — DISCHARGE INSTRUCTIONS
"HOME CARE FOLLOWING DIVERTICULITIS ADMISSION  CARMENZA Hicks E. Gavin, N. Guttormson, D. Maurer, SHIELA Laura, MARCE Latif   Special instructions for Jennifer Payne:  --call and schedule visit with Dr. Lenz at the end of your antibiotic course    --Discharge prescriptions: Augmentin x 14 days         DRAIN/DRAIN SITE CARE:  If you are discharged from the hospital with a drain in place, monitor and record the output as instructed by your nurse.  Once the output has appropriately decreased, the drain will be removed at your surgeon's office.  Replace the bandage over your drain (or previous drain site) until all drainage stops, or if more comfortable to have in place.      BATHING:  If you had a drain in place at any time, avoid baths until 1 week after drain removal.  Showers are okay any time after the drain is out.  You may wash your hair at any time.  Gently pat your incision dry after bathing before replacing a dressing.    ACTIVITY:  Light Activity -- you may immediately be up and about as tolerated.  Driving -- you may drive when comfortable and off narcotic pain medications.  Light Work -- resume when comfortable off pain medications.  (If you can drive, you probably can work.)  Strenuous Work/Activity -- limit lifting to 15 pounds for 1-2 weeks.  Then, progressively increase with time.  Active Sports (running, biking, etc.) -- cautiously resume after 4-6 weeks, or when cleared by your surgeon.    DISCOMFORT:  Use pain medications as prescribed by your surgeon.  Take the pain medication with some food, when possible, to minimize side effects.  Expect gradual improvement.    ANTIBIOTIC THERAPY:  Finish the entire course of antibiotics which have been prescribed.  Contact your surgeon's office if you finish your course of antibiotics and are still feeling any residual abdominal pain or signs of your infection.    DIET:  Continue on a \"soft\" diet (i.e. cooked vegetables, soups, " processed meats, light/white bread, mashed potatoes, rice, yogurt) for the first week after discharge from the hospital.  After this time, you may return to diet you were on before surgery.  In general, and specifically while taking pain medications, increase dietary fiber or add a fiber supplementation like Metamucil or Citrucel to help prevent constipation (this is also a possible side effect of pain medications).  Drink plenty of fluids.    RETURN APPOINTMENT:  Schedule a follow-up visit 2-3 weeks after discharge from the hospital.  Office Phone:  [phone]     CONTACT US IF THE FOLLOWING DEVELOPS:   1. A fever that is above 101     2. If there is a large amount of drainage, bleeding, or swelling.   3. Severe pain that is not relieved by your prescription.   4. Drainage that is thick, cloudy, yellow, green or white.   5. Any other questions not answered by  Frequently Asked Questions  sheet.      FREQUENTLY ASKED QUESTIONS:    Q:  What can I do to minimize constipation (very hard stools, or lack of stools)?  A:  Stay well hydrated.  Increase your dietary fiber intake or take a fiber supplement -with plenty of water.  Walk around frequently.  You may consider an over-the-counter stool-softener.  Your Pharmacist can assist you with choosing one that is stocked at your pharmacy.  Constipation is also one of the most common side effects of pain medication.  If you are using pain medication, be pro-active and try to PREVENT problems with constipation by taking the steps above BEFORE constipation becomes a problem.    Q:  What do I do if I need more pain medications?  A:  Call the office to receive refills.  Be aware that certain pain meds cannot be called into a pharmacy and actually require a paper prescription.  A change may be made in your pain med as you progress thru your recovery period or if you have side effects to certain meds.    --Pain meds are NOT refilled after 5pm on weekdays, and NOT AT ALL on the  weekends, so please look ahead to prevent problems.      Q:  Why am I having a hard time sleeping now that I am at home?  A:  Many medications you receive while you are in the hospital can impact your sleep for a number of days after your surgery/hospitalization.  Decreased level of activity and naps during the day may also make sleeping at night difficult.  Try to minimize day-time naps, and get up frequently during the day to walk around your home during your recovery time.  Sleep aides may be of some help, but are not recommended for long-term use.      Q:  I am having some back discomfort.  What should I do?  A:  This may be related to certain positioning that was required for your surgery, extended periods of time in bed, or other changes in your overall activity level.  You may try ice, heat, acetaminophen, or ibuprofen to treat this temporarily.  Note that many pain medications have acetaminophen in them and would state this on the prescription bottle.  Be sure not to exceed the maximum of 4000mg per day of acetaminophen.     **If the pain you are having does not resolve, is severe, or is a flare of back pain you have had on other occasions prior to surgery, please contact your primary physician for further recommendations or for an appointment to be examined at their office.    Q:  Why am I having headaches?  A:  Headaches can be caused by many things:  caffeine withdrawal, use of pain meds, dehydration, high blood pressure, lack of sleep, over-activity/exhaustion, flare-up of usual migraine headaches.  If you feel this is related to muscle tension (a band-like feeling around the head, or a pressure at the low-back of the head) you may try ice or heat to this area.  You may need to drink more fluids (try electrolyte drink like Gatorade), rest, or take your usual migraine medications.   **If your headaches do not resolve, worsen, are accompanied by other symptoms, or if your blood pressure is high, please  call your primary physician for recommendation and/or examination.        If you have other questions, please call the office Monday thru Friday between 8am and 5pm to discuss with the nurse or physician assistant.  [phone]    There is a surgeon ON CALL on weekday evenings and over the weekend in case of urgent need only, and may be contacted at the same number.    If you are having an emergency, call 911 or proceed to your nearest emergency department.

## 2017-07-29 NOTE — PROGRESS NOTES
[facility]   General Surgery Progress Note           Assessment and Plan:   Assessment:   Jejunal diverticulitis  Ileus, resolved  Afebrile      Plan:   OK to DC today, pending nutrition input, per patient request.   DC Rx: Augmenting x  2 weeks.    OTC bowel program prn.    RTC in 10-14 days, prior to completion of antibiotics,for follow up with Dr. Lenz.    Discharge instructions were reviewed with the patient in detail.  All her questions/concerns were addressed.  She is aware that a printed copy of instructions will be given to her upon discharge.         Interval History:   Comfortable up in chair,  at bedside. Reports minimal pain, tolerating full liquid diet without nausea/vomiting, bloating or increased pain. +flatus, +BM, up walking, voiding independently.        Physical Exam:   Blood pressure 128/79, pulse 81, temperature 98.6  F (37  C), temperature source Oral, resp. rate 16, weight 78.5 kg (173 lb 1.6 oz), SpO2 98 %.    I/O last 3 completed shifts:  In: 3842 [P.O.:1420; I.V.:2422]  Out: -     Abdomen:   soft, mildly distended, non-tender and normal bowel sounds           Data:       Recent Labs  Lab 07/28/17  0650 07/27/17  0720 07/26/17  0633   HGB 11.2* 11.6* 12.0       Recent Labs  Lab 07/28/17  0650 07/27/17  0720 07/26/17  0633   WBC 7.9 13.6* 19.3*       Lucina Campos PA-C

## 2017-07-29 NOTE — CONSULTS
NUTRITION ASSESSMENT & EDUCATION NOTE    REASON FOR ASSESSMENT:  Nutrition education: MD wants pt on low roughage diet and patient needs info on difference b/w low fiber and low roughage    NUTRITION HISTORY:  Information obtained from patient and   Patient has been on low fiber and high fiber diets previously. Turkey, eggs, white bread, fruit juice when on low fiber. Likes fruits and vegetables, consumes regularly when on high fiber    CURRENT DIET AND NOURISHMENT ORDER:  Diet: full liquid, low fiber x 2 weeks  Current Intake/Tolerance: no issues noted    ANTHROPOMETRICS, ESTIMATED NEEDS  Deferred - d/c home    NUTRITION DIAGNOSIS:  Altered GI function related to acute diverticulitis AEB need for low fiber diet     INTERVENTIONS:  Nutrition Prescription:  Recommended low residue/fiber diet per MD discretion    Implementation:    Nutrition education: Nutrition Education (Content):  a) Provided handouts:  - Fiber content of foods  - How to read a nutrition facts label  - Low fiber diet  - High fiber diet  - Diet management for diverticular  b) Discussed low fiber diet for ~2 weeks per MD of about 10-13 grams of total dietary fiber; adequate fiber of >25-30 grams after this with gradual increase of 1-2 grams per day until goal reached; adequate fluid intake to prevent constipation; high fiber foods  c) Discussed roughage, soluble, insoluble fiber and residue and how they apply to restrictions    Nutrition Education (Application):  a) Discussed current eating habits and recommended alternative food choices - including fruits/vegetables at every meal once high fiber; cooked/peeled/canned intake of fruits and vegetables while low fiber with tender/lean proteins and avoiding whole grains.  b) Discussed plate method as noted above    Anticipate good compliance    Diet Education - refer to Education Flowsheet    Goals:    Patient verbalizes understanding of diet    All of the above goals met during education  session    Follow Up/Monitoring:    Provided RD contact information for future questions    Recommend Out-Patient Nutrition Referral if further diet instructions are needed    Will re-evaluate in 7 days, on sooner, if nutrition status changes    SHIRLEY Pastor  3rd floor/ICU: [phone]  All other floors: [phone]  Weekend/holiday: [phone]  Office: [phone]

## 2017-07-29 NOTE — PLAN OF CARE
Problem: Goal Outcome Summary  Goal: Goal Outcome Summary  Outcome: Improving  Pt remains admitted for diverticulitis of the jejunum.  Alert and oriented. Slept well between cares.  VSS, afebrile.  Denied pain and nausea throughout the night.  WBC was 7.9, on IV zosyn q6hr.  Had 2 BMs yesterday, passing gas.  Full liquid diet, tolerating well.  Up ind.  Ambulated the halls a few times throughout shift.  Pt is hoping to go home today.

## 2017-07-29 NOTE — PLAN OF CARE
Problem: Goal Outcome Summary  Goal: Goal Outcome Summary  Outcome: Adequate for Discharge Date Met:  07/29/17  Pt tolerating full liquid diet. Ambulating in halls, passing flatus and stool. Dietician consult, ok to dc home. DC insrtuctions and meds reviewed. Sxcript being filled here. IV abx infusing and then will dc when script arrives.

## 2017-07-29 NOTE — PLAN OF CARE
Problem: Individualization  Goal: Patient Preferences  Outcome: Adequate for Discharge Date Met:  07/29/17  Pt dc at 1315 via wc and escort

## 2017-08-05 ENCOUNTER — HEALTH MAINTENANCE LETTER (OUTPATIENT)
Age: 57
End: 2017-08-05

## 2017-08-14 ENCOUNTER — OFFICE VISIT (OUTPATIENT)
Dept: SURGERY | Facility: CLINIC | Age: 57
End: 2017-08-14
Payer: COMMERCIAL

## 2017-08-14 VITALS
OXYGEN SATURATION: 99 % | HEART RATE: 67 BPM | WEIGHT: 165 LBS | HEIGHT: 68 IN | BODY MASS INDEX: 25.01 KG/M2 | SYSTOLIC BLOOD PRESSURE: 120 MMHG | DIASTOLIC BLOOD PRESSURE: 88 MMHG

## 2017-08-14 DIAGNOSIS — K57.10 DIVERTICULOSIS OF SMALL INTESTINE WITHOUT HEMORRHAGE: Primary | ICD-10-CM

## 2017-08-14 PROCEDURE — 99213 OFFICE O/P EST LOW 20 MIN: CPT | Performed by: SURGERY

## 2017-08-14 ASSESSMENT — ENCOUNTER SYMPTOMS
VOMITING: 1
APPETITE CHANGE: 1
CONSTIPATION: 1
NAUSEA: 1
DIARRHEA: 1
ABDOMINAL PAIN: 1
CHANGE IN BOWEL HABIT: 1
WEIGHT LOSS: 1

## 2017-08-14 NOTE — PROGRESS NOTES
She returns to discuss surgery for her jejunal diverticulum.  She has completed her antibiotic course and feels well.  She has had no fevers.  Her bowels were irregular while on antibiotics and she has noticed some constipation after discontinuing the antibiotics.  She had a small amount of bright red blood with a difficult bowel movement but this is not unusual for her as she has had hemorrhoid problems.    We discussed surgery in detail including incision, scar, conversion to open surgery, infection, bleeding, expected recovery both with laparoscopic and open surgery.  If she has recurrence of symptoms, she will return to the emergency room to be admitted for antibiotic therapy and we will pursue more urgent surgery.    All her questions were answered and she is ready to schedule surgery.  We will do so in the next week or two.    Pj Lenz MD  8/14/2017 4:24 PM    Please route or send letter to:  Primary Care Provider (PCP) and Include Progress Note

## 2017-08-14 NOTE — PROGRESS NOTES
HPI      ROS (Review of Systems):      Positive for weight loss, appetite change and bloody stools .   GASTROINTESTINAL: Positive for nausea, vomiting, abdominal pain, diarrhea, constipation and change in bowel habit.   MUSCULOSKELETAL: Positive for back pain.          Physical Exam

## 2017-08-14 NOTE — MR AVS SNAPSHOT
After Visit Summary   8/14/2017    Jennifer Payne    MRN: [id number]           Patient Information     Date Of Birth          1960        Visit Information        Provider Department      8/14/2017 4:00 PM Pj Lenz MD [facility] General Surgery      Today's Diagnoses     Diverticulosis of small intestine without hemorrhage    -  1       Follow-ups after your visit        Your next 10 appointments already scheduled     Aug 25, 2017   Procedure with Pj Lenz MD   [facility] PeriOp Services (--)    [address]  [facility] [address]   [phone]            Aug 25, 2017  1:30 PM CDT   [facility] OR with Pj Lenz MD, Lucina Campos PA-C   [facility] ([facility])    [facility] ([facility])   [phone]              Who to contact     If you have questions or need follow up information about today's clinic visit or your schedule please contact [facility] directly at [phone].  Normal or non-critical lab and imaging results will be communicated to you by Everloophart, letter or phone within 4 business days after the clinic has received the results. If you do not hear from us within 7 days, please contact the clinic through Semetrict or phone. If you have a critical or abnormal lab result, we will notify you by phone as soon as possible.  Submit refill requests through KarmYog [employer] or call your pharmacy and they will forward the refill request to us. Please allow 3 business days for your refill to be completed.          Additional Information About Your Visit        Everloophart Information     [employer] gives you secure access to your electronic health record. If you see a primary care provider, you can also send messages to your care team and make appointments. If you have questions, please call your  "primary care clinic.  If you do not have a primary care provider, please call [phone] and they will assist you.        Care EveryWhere ID     This is your Care EveryWhere ID. This could be used by other organizations to access your [place] medical records  [id number]        Your Vitals Were     Pulse Height Pulse Oximetry Breastfeeding? BMI (Body Mass Index)       67 5' 8\" (1.727 m) 99% No 25.09 kg/m2        Blood Pressure from Last 3 Encounters:   08/14/17 120/88   07/29/17 128/79   07/11/09 122/90    Weight from Last 3 Encounters:   08/14/17 165 lb (74.8 kg)   07/24/17 173 lb 1.6 oz (78.5 kg)              Today, you had the following     No orders found for display       Primary Care Provider Office Phone # Fax #    [facility] [phone] [address] [place] [address]        Equal Access to Services     JACK HARMON AH: Hadii aad ku hadasho Soomaali, waaxda luqadaha, qaybta kaalmada adeegyada, waxay idiin hayshadn delfino kharamike lasusy . So [facility] [phone].    ATENCIÓN: Si habla español, tiene a carter disposición servicios gratuitos de asistencia lingüística. Llame al [phone].    We comply with applicable federal civil rights laws and [place] laws. We do not discriminate on the basis of race, color, national origin, age, disability sex, sexual orientation or gender identity.            Thank you!     Thank you for choosing [facility]  for your care. Our goal is always to provide you with excellent care. Hearing back from our patients is one way we can continue to improve our services. Please take a few minutes to complete the written survey that you may receive in the mail after your visit with us. Thank you!             Your Updated Medication List - Protect others around you: Learn how to safely use, store and throw away your medicines at www.disposemymeds.org.          This list is accurate as of: 8/14/17  4:27 PM.  Always use your most " recent med list.                   Brand Name Dispense Instructions for use Diagnosis    multivitamin, therapeutic Tabs tablet      Take 1 tablet by mouth daily        Vitamin D (Cholecalciferol) 1000 UNITS Caps      Take 1,000 Units by mouth daily

## 2017-08-24 NOTE — PHARMACY-ADMISSION MEDICATION HISTORY
Med rec complete per preadmitting RN:  Carlita Rodriguez, RN on 8/21/2017 at 12:35 PM    Prior to Admission medications    Medication Sig Last Dose Taking? Auth Provider   Vitamin D, Cholecalciferol, 1000 UNITS CAPS Take 1,000 Units by mouth daily   Reported, Patient   multivitamin, therapeutic (THERA-VIT) TABS tablet Take 1 tablet by mouth daily   Reported, Patient

## 2017-08-25 ENCOUNTER — OFFICE VISIT (OUTPATIENT)
Dept: SURGERY | Facility: PHYSICIAN GROUP | Age: 57
End: 2017-08-25
Payer: COMMERCIAL

## 2017-08-25 ENCOUNTER — ANESTHESIA (OUTPATIENT)
Dept: SURGERY | Facility: CLINIC | Age: 57
DRG: 331 | End: 2017-08-25
Payer: COMMERCIAL

## 2017-08-25 ENCOUNTER — HOSPITAL ENCOUNTER (INPATIENT)
Facility: CLINIC | Age: 57
LOS: 4 days | Discharge: HOME | DRG: 331 | End: 2017-08-29
Attending: SURGERY | Admitting: SURGERY
Payer: COMMERCIAL

## 2017-08-25 ENCOUNTER — ANESTHESIA EVENT (OUTPATIENT)
Dept: SURGERY | Facility: CLINIC | Age: 57
DRG: 331 | End: 2017-08-25
Payer: COMMERCIAL

## 2017-08-25 DIAGNOSIS — Z53.9 ERRONEOUS ENCOUNTER--DISREGARD: Primary | ICD-10-CM

## 2017-08-25 PROBLEM — K57.12 DIVERTICULITIS OF JEJUNUM: Status: ACTIVE | Noted: 2017-08-25

## 2017-08-25 LAB — PLATELET # BLD AUTO: 158 10E9/L (ref 150–450)

## 2017-08-25 PROCEDURE — 25000128 H RX IP 250 OP 636: Performed by: SURGERY

## 2017-08-25 PROCEDURE — 25000566 ZZH SEVOFLURANE, EA 15 MIN: Performed by: SURGERY

## 2017-08-25 PROCEDURE — 0DBA0ZZ EXCISION OF JEJUNUM, OPEN APPROACH: ICD-10-PCS | Performed by: SURGERY

## 2017-08-25 PROCEDURE — 71000012 ZZH RECOVERY PHASE 1 LEVEL 1 FIRST HR: Performed by: SURGERY

## 2017-08-25 PROCEDURE — 40000306 ZZH STATISTIC PRE PROC ASSESS II: Performed by: SURGERY

## 2017-08-25 PROCEDURE — 25000128 H RX IP 250 OP 636: Performed by: NURSE ANESTHETIST, CERTIFIED REGISTERED

## 2017-08-25 PROCEDURE — 36000058 ZZH SURGERY LEVEL 3 EA 15 ADDTL MIN: Performed by: SURGERY

## 2017-08-25 PROCEDURE — 25000125 ZZHC RX 250: Performed by: SURGERY

## 2017-08-25 PROCEDURE — 88307 TISSUE EXAM BY PATHOLOGIST: CPT | Performed by: SURGERY

## 2017-08-25 PROCEDURE — 88307 TISSUE EXAM BY PATHOLOGIST: CPT | Mod: 26 | Performed by: SURGERY

## 2017-08-25 PROCEDURE — 25000125 ZZHC RX 250: Performed by: NURSE ANESTHETIST, CERTIFIED REGISTERED

## 2017-08-25 PROCEDURE — 12000000 ZZH R&B MED SURG/OB

## 2017-08-25 PROCEDURE — 36000056 ZZH SURGERY LEVEL 3 1ST 30 MIN: Performed by: SURGERY

## 2017-08-25 PROCEDURE — 37000008 ZZH ANESTHESIA TECHNICAL FEE, 1ST 30 MIN: Performed by: SURGERY

## 2017-08-25 PROCEDURE — 25000128 H RX IP 250 OP 636: Performed by: ANESTHESIOLOGY

## 2017-08-25 PROCEDURE — 85049 AUTOMATED PLATELET COUNT: CPT | Performed by: SURGERY

## 2017-08-25 PROCEDURE — 44202 LAP ENTERECTOMY: CPT | Performed by: SURGERY

## 2017-08-25 PROCEDURE — 37000009 ZZH ANESTHESIA TECHNICAL FEE, EACH ADDTL 15 MIN: Performed by: SURGERY

## 2017-08-25 PROCEDURE — 27210794 ZZH OR GENERAL SUPPLY STERILE: Performed by: SURGERY

## 2017-08-25 PROCEDURE — 44202 LAP ENTERECTOMY: CPT | Mod: AS | Performed by: PHYSICIAN ASSISTANT

## 2017-08-25 PROCEDURE — 36415 COLL VENOUS BLD VENIPUNCTURE: CPT | Performed by: SURGERY

## 2017-08-25 RX ORDER — KETOROLAC TROMETHAMINE 30 MG/ML
30 INJECTION, SOLUTION INTRAMUSCULAR; INTRAVENOUS EVERY 6 HOURS PRN
Status: DISCONTINUED | OUTPATIENT
Start: 2017-08-25 | End: 2017-08-25 | Stop reason: HOSPADM

## 2017-08-25 RX ORDER — HYDRALAZINE HYDROCHLORIDE 20 MG/ML
2.5-5 INJECTION INTRAMUSCULAR; INTRAVENOUS EVERY 10 MIN PRN
Status: DISCONTINUED | OUTPATIENT
Start: 2017-08-25 | End: 2017-08-25 | Stop reason: HOSPADM

## 2017-08-25 RX ORDER — FENTANYL CITRATE 50 UG/ML
INJECTION, SOLUTION INTRAMUSCULAR; INTRAVENOUS PRN
Status: DISCONTINUED | OUTPATIENT
Start: 2017-08-25 | End: 2017-08-25

## 2017-08-25 RX ORDER — ERTAPENEM 1 G/1
1 INJECTION, POWDER, LYOPHILIZED, FOR SOLUTION INTRAMUSCULAR; INTRAVENOUS ONCE
Status: COMPLETED | OUTPATIENT
Start: 2017-08-26 | End: 2017-08-26

## 2017-08-25 RX ORDER — ERTAPENEM 1 G/1
1 INJECTION, POWDER, LYOPHILIZED, FOR SOLUTION INTRAMUSCULAR; INTRAVENOUS
Status: COMPLETED | OUTPATIENT
Start: 2017-08-25 | End: 2017-08-25

## 2017-08-25 RX ORDER — LIDOCAINE HYDROCHLORIDE 10 MG/ML
INJECTION, SOLUTION INFILTRATION; PERINEURAL PRN
Status: DISCONTINUED | OUTPATIENT
Start: 2017-08-25 | End: 2017-08-25

## 2017-08-25 RX ORDER — SODIUM CHLORIDE 9 MG/ML
INJECTION, SOLUTION INTRAVENOUS CONTINUOUS
Status: DISCONTINUED | OUTPATIENT
Start: 2017-08-25 | End: 2017-08-26

## 2017-08-25 RX ORDER — SODIUM CHLORIDE, SODIUM LACTATE, POTASSIUM CHLORIDE, CALCIUM CHLORIDE 600; 310; 30; 20 MG/100ML; MG/100ML; MG/100ML; MG/100ML
INJECTION, SOLUTION INTRAVENOUS CONTINUOUS
Status: DISCONTINUED | OUTPATIENT
Start: 2017-08-25 | End: 2017-08-25 | Stop reason: HOSPADM

## 2017-08-25 RX ORDER — NALOXONE HYDROCHLORIDE 0.4 MG/ML
.1-.4 INJECTION, SOLUTION INTRAMUSCULAR; INTRAVENOUS; SUBCUTANEOUS
Status: DISCONTINUED | OUTPATIENT
Start: 2017-08-25 | End: 2017-08-29 | Stop reason: HOSPADM

## 2017-08-25 RX ORDER — EPHEDRINE SULFATE 50 MG/ML
INJECTION, SOLUTION INTRAMUSCULAR; INTRAVENOUS; SUBCUTANEOUS PRN
Status: DISCONTINUED | OUTPATIENT
Start: 2017-08-25 | End: 2017-08-25

## 2017-08-25 RX ORDER — LIDOCAINE 40 MG/G
CREAM TOPICAL
Status: DISCONTINUED | OUTPATIENT
Start: 2017-08-25 | End: 2017-08-29 | Stop reason: HOSPADM

## 2017-08-25 RX ORDER — ONDANSETRON 4 MG/1
4 TABLET, ORALLY DISINTEGRATING ORAL EVERY 30 MIN PRN
Status: DISCONTINUED | OUTPATIENT
Start: 2017-08-25 | End: 2017-08-25 | Stop reason: HOSPADM

## 2017-08-25 RX ORDER — NALOXONE HYDROCHLORIDE 0.4 MG/ML
.1-.4 INJECTION, SOLUTION INTRAMUSCULAR; INTRAVENOUS; SUBCUTANEOUS
Status: DISCONTINUED | OUTPATIENT
Start: 2017-08-25 | End: 2017-08-25 | Stop reason: HOSPADM

## 2017-08-25 RX ORDER — DEXAMETHASONE SODIUM PHOSPHATE 4 MG/ML
INJECTION, SOLUTION INTRA-ARTICULAR; INTRALESIONAL; INTRAMUSCULAR; INTRAVENOUS; SOFT TISSUE PRN
Status: DISCONTINUED | OUTPATIENT
Start: 2017-08-25 | End: 2017-08-25

## 2017-08-25 RX ORDER — BUPIVACAINE HYDROCHLORIDE AND EPINEPHRINE 2.5; 5 MG/ML; UG/ML
INJECTION, SOLUTION EPIDURAL; INFILTRATION; INTRACAUDAL; PERINEURAL PRN
Status: DISCONTINUED | OUTPATIENT
Start: 2017-08-25 | End: 2017-08-25 | Stop reason: HOSPADM

## 2017-08-25 RX ORDER — PROPOFOL 10 MG/ML
INJECTION, EMULSION INTRAVENOUS PRN
Status: DISCONTINUED | OUTPATIENT
Start: 2017-08-25 | End: 2017-08-25

## 2017-08-25 RX ORDER — ONDANSETRON 2 MG/ML
4 INJECTION INTRAMUSCULAR; INTRAVENOUS EVERY 30 MIN PRN
Status: DISCONTINUED | OUTPATIENT
Start: 2017-08-25 | End: 2017-08-25 | Stop reason: HOSPADM

## 2017-08-25 RX ORDER — FENTANYL CITRATE 50 UG/ML
25-50 INJECTION, SOLUTION INTRAMUSCULAR; INTRAVENOUS EVERY 5 MIN PRN
Status: DISCONTINUED | OUTPATIENT
Start: 2017-08-25 | End: 2017-08-25 | Stop reason: HOSPADM

## 2017-08-25 RX ORDER — FENTANYL CITRATE 50 UG/ML
25-50 INJECTION, SOLUTION INTRAMUSCULAR; INTRAVENOUS
Status: DISCONTINUED | OUTPATIENT
Start: 2017-08-25 | End: 2017-08-25 | Stop reason: HOSPADM

## 2017-08-25 RX ORDER — ACETAMINOPHEN 10 MG/ML
1000 INJECTION, SOLUTION INTRAVENOUS EVERY 6 HOURS PRN
Status: DISCONTINUED | OUTPATIENT
Start: 2017-08-25 | End: 2017-08-28

## 2017-08-25 RX ORDER — ONDANSETRON 2 MG/ML
4 INJECTION INTRAMUSCULAR; INTRAVENOUS EVERY 6 HOURS PRN
Status: DISCONTINUED | OUTPATIENT
Start: 2017-08-25 | End: 2017-08-29 | Stop reason: HOSPADM

## 2017-08-25 RX ORDER — DIPHENHYDRAMINE HYDROCHLORIDE 50 MG/ML
25 INJECTION INTRAMUSCULAR; INTRAVENOUS EVERY 6 HOURS PRN
Status: DISCONTINUED | OUTPATIENT
Start: 2017-08-25 | End: 2017-08-29 | Stop reason: HOSPADM

## 2017-08-25 RX ORDER — LIDOCAINE 40 MG/G
CREAM TOPICAL
Status: DISCONTINUED | OUTPATIENT
Start: 2017-08-25 | End: 2017-08-25 | Stop reason: HOSPADM

## 2017-08-25 RX ORDER — NEOSTIGMINE METHYLSULFATE 1 MG/ML
VIAL (ML) INJECTION PRN
Status: DISCONTINUED | OUTPATIENT
Start: 2017-08-25 | End: 2017-08-25

## 2017-08-25 RX ORDER — GLYCOPYRROLATE 0.2 MG/ML
INJECTION, SOLUTION INTRAMUSCULAR; INTRAVENOUS PRN
Status: DISCONTINUED | OUTPATIENT
Start: 2017-08-25 | End: 2017-08-25

## 2017-08-25 RX ORDER — MEPERIDINE HYDROCHLORIDE 25 MG/ML
12.5 INJECTION INTRAMUSCULAR; INTRAVENOUS; SUBCUTANEOUS
Status: DISCONTINUED | OUTPATIENT
Start: 2017-08-25 | End: 2017-08-25 | Stop reason: HOSPADM

## 2017-08-25 RX ORDER — ONDANSETRON 2 MG/ML
INJECTION INTRAMUSCULAR; INTRAVENOUS PRN
Status: DISCONTINUED | OUTPATIENT
Start: 2017-08-25 | End: 2017-08-25

## 2017-08-25 RX ORDER — HYDROMORPHONE HYDROCHLORIDE 1 MG/ML
.3-.5 INJECTION, SOLUTION INTRAMUSCULAR; INTRAVENOUS; SUBCUTANEOUS
Status: DISCONTINUED | OUTPATIENT
Start: 2017-08-25 | End: 2017-08-29 | Stop reason: HOSPADM

## 2017-08-25 RX ORDER — HEPARIN SODIUM 5000 [USP'U]/.5ML
5000 INJECTION, SOLUTION INTRAVENOUS; SUBCUTANEOUS EVERY 8 HOURS
Status: DISCONTINUED | OUTPATIENT
Start: 2017-08-26 | End: 2017-08-29 | Stop reason: HOSPADM

## 2017-08-25 RX ADMIN — Medication 3 MG: at 11:19

## 2017-08-25 RX ADMIN — DEXAMETHASONE SODIUM PHOSPHATE 4 MG: 4 INJECTION, SOLUTION INTRA-ARTICULAR; INTRALESIONAL; INTRAMUSCULAR; INTRAVENOUS; SOFT TISSUE at 09:53

## 2017-08-25 RX ADMIN — ERTAPENEM SODIUM 1 G: 1 INJECTION, POWDER, LYOPHILIZED, FOR SOLUTION INTRAMUSCULAR; INTRAVENOUS at 09:48

## 2017-08-25 RX ADMIN — ONDANSETRON 4 MG: 2 INJECTION INTRAMUSCULAR; INTRAVENOUS at 11:02

## 2017-08-25 RX ADMIN — FENTANYL CITRATE 50 MCG: 50 INJECTION, SOLUTION INTRAMUSCULAR; INTRAVENOUS at 10:14

## 2017-08-25 RX ADMIN — SODIUM CHLORIDE: 9 INJECTION, SOLUTION INTRAVENOUS at 23:42

## 2017-08-25 RX ADMIN — SODIUM CHLORIDE, POTASSIUM CHLORIDE, SODIUM LACTATE AND CALCIUM CHLORIDE: 600; 310; 30; 20 INJECTION, SOLUTION INTRAVENOUS at 10:30

## 2017-08-25 RX ADMIN — SODIUM CHLORIDE: 9 INJECTION, SOLUTION INTRAVENOUS at 13:50

## 2017-08-25 RX ADMIN — HYDROMORPHONE HYDROCHLORIDE 0.5 MG: 1 INJECTION, SOLUTION INTRAMUSCULAR; INTRAVENOUS; SUBCUTANEOUS at 10:23

## 2017-08-25 RX ADMIN — FENTANYL CITRATE 50 MCG: 50 INJECTION, SOLUTION INTRAMUSCULAR; INTRAVENOUS at 09:50

## 2017-08-25 RX ADMIN — MIDAZOLAM HYDROCHLORIDE 2 MG: 1 INJECTION, SOLUTION INTRAMUSCULAR; INTRAVENOUS at 09:40

## 2017-08-25 RX ADMIN — PROPOFOL 150 MG: 10 INJECTION, EMULSION INTRAVENOUS at 09:53

## 2017-08-25 RX ADMIN — HYDROMORPHONE HYDROCHLORIDE 0.5 MG: 1 INJECTION, SOLUTION INTRAMUSCULAR; INTRAVENOUS; SUBCUTANEOUS at 10:18

## 2017-08-25 RX ADMIN — SODIUM CHLORIDE, POTASSIUM CHLORIDE, SODIUM LACTATE AND CALCIUM CHLORIDE: 600; 310; 30; 20 INJECTION, SOLUTION INTRAVENOUS at 09:47

## 2017-08-25 RX ADMIN — ACETAMINOPHEN 1000 MG: 10 INJECTION, SOLUTION INTRAVENOUS at 20:29

## 2017-08-25 RX ADMIN — SODIUM CHLORIDE, POTASSIUM CHLORIDE, SODIUM LACTATE AND CALCIUM CHLORIDE: 600; 310; 30; 20 INJECTION, SOLUTION INTRAVENOUS at 12:04

## 2017-08-25 RX ADMIN — ROCURONIUM BROMIDE 10 MG: 10 INJECTION INTRAVENOUS at 10:14

## 2017-08-25 RX ADMIN — LIDOCAINE HYDROCHLORIDE 50 MG: 10 INJECTION, SOLUTION INFILTRATION; PERINEURAL at 09:49

## 2017-08-25 RX ADMIN — GLYCOPYRROLATE 0.4 MG: 0.2 INJECTION, SOLUTION INTRAMUSCULAR; INTRAVENOUS at 11:19

## 2017-08-25 RX ADMIN — FENTANYL CITRATE 100 MCG: 50 INJECTION, SOLUTION INTRAMUSCULAR; INTRAVENOUS at 09:49

## 2017-08-25 RX ADMIN — ROCURONIUM BROMIDE 50 MG: 10 INJECTION INTRAVENOUS at 09:53

## 2017-08-25 RX ADMIN — ROCURONIUM BROMIDE 10 MG: 10 INJECTION INTRAVENOUS at 10:39

## 2017-08-25 RX ADMIN — PANTOPRAZOLE SODIUM 40 MG: 40 INJECTION, POWDER, FOR SOLUTION INTRAVENOUS at 15:10

## 2017-08-25 RX ADMIN — KETOROLAC TROMETHAMINE 30 MG: 30 INJECTION, SOLUTION INTRAMUSCULAR at 12:15

## 2017-08-25 RX ADMIN — Medication 5 MG: at 10:05

## 2017-08-25 RX ADMIN — ACETAMINOPHEN 1000 MG: 10 INJECTION, SOLUTION INTRAVENOUS at 11:52

## 2017-08-25 NOTE — IP AVS SNAPSHOT
[place] [address] [facility]    [address]    [facility] [address]    Phone:  [phone]    Fax:  [phone]                                       After Visit Summary   8/25/2017    Jennifer Payne    MRN: [id number]           After Visit Summary Signature Page     I have received my discharge instructions, and my questions have been answered. I have discussed any challenges I see with this plan with the nurse or doctor.    ..........................................................................................................................................  Patient/Patient Representative Signature      ..........................................................................................................................................  Patient Representative Print Name and Relationship to Patient    ..................................................               ................................................  Date                                            Time    ..........................................................................................................................................  Reviewed by Signature/Title    ...................................................              ..............................................  Date                                                            Time

## 2017-08-25 NOTE — OP NOTE
General Surgery Operative Note    PREOPERATIVE DIAGNOSIS:  Jejunoileal Diverticulitis     POSTOPERATIVE DIAGNOSIS:  Same    PROCEDURE:  Laparoscopic assisted jejunal resection    ANESTHESIA:  General.    PREOPERATIVE MEDICATIONS:  Ancef IV.    SURGEON:  Pj Lenz MD    ASSISTANT:  Lucina Campos PA-C    ESTIMATED BLOOD LOSS:  10cc's    INDICATIONS:  Jennifer Payne is a 57 year old female who was treated for jejunal diverticulitis. Imaging showed a 6 cm diverticulum. She now presents for resection of this diverticulum to prevent future infection. She understands that new diverticula can develop after surgery.     Description: After induction of anesthesia, prep, drape and time out were performed and an incision was made above the umbilicus and extended to the fascia. The fascia was opened and the [place] trochar was placed and gas was insufflated. There was no underlying bowel or tissue injury. Two 5 mm trochars were placed in the midline with laparoscopic guidance. The omentum and transverse colon were gently displaced into the upper abdomen. Several small diverticula were noted as well as one larger one - about 3 cm. One area of jejunum was significantly scarred to the overlying distal transverse colon mesentery. This scar was gradually taken down revealing a significantly scarred diverticulum. This was presumed to be the previously infected diverticulum. Once mobilization was complete, the small bowel was run proximally to where the duodenum passed under the mesentery and then distally to the ileocecal valve. Dr Locke was between cases and was able to be present for this portion of the operation and reviewed our findings. No other abnormalities were noted. The appendix was normal.   A small laparotomy incision was made around the umbilicus and the jejunum containing the previously infected diverticulum was brought up after placing a wound protector. The other large diverticulum was a few  inches proximal and was also exteriorized.  Poi nts were chosen to divide the bowel so that both these large diverticula were removed. The bowel was divided with a CHENCHO stapler and the mesentery with a ligasure. The jejunum was then anastomosed in a side to side  ( functional end to end) manner by placing a limb of the CHENCHO stapler down the antimesenteric side of each jejunal limb and firing. The anastomosis was checked from within to assure hemostasis. The residual defect wqas closed with a linear stapled and the anastomosis was pressurized and showed no leak. The apex was reinforced with a vicryl suture and the mesenteric defect was closed. The bowel ws returned to the abdomen. The omentim was placed under the incision and after placing marcaine, the incision was closed with heavy PDS suture. This also entailed repairing a small umbilical hernia. The subcutaneous tissues were closed with widely spaced interrupted vicryl and the skin with subcuticular vicryl, leaving gaps for possible drainage. Dressings and a binder were placed and she was returned to the recovery room with all counts correct.   The physicians assistant was medically necessary for their expertise in retraction, suturing, hemostasis, and suctioning.      INTRAOPERATIVE FINDINGS:  2 large jejunal diverticula (one heavily scarred), several (?6) small jejunal diverticula    Specimens:   ID Type Source Tests Collected by Time Destination   A : Jejunum Tissue Other SURGICAL PATHOLOGY EXAM Pj Lenz MD 8/25/2017 11:19 AM        Pj Lenz MD

## 2017-08-25 NOTE — ANESTHESIA CARE TRANSFER NOTE
Patient: Jennifer Payne    Procedure(s):  Laproscopic assisted JEJUNOILEAL Resection  - Wound Class: I-Clean    Diagnosis: Jejunoileal Diverticulitis   Diagnosis Additional Information: No value filed.    Anesthesia Type:   General, ETT     Note:  Airway :Face Mask  Patient transferred to:PACU  Comments: Report and signed off to RN in PACU.  Good Resps, skin pink, VSS, O2 via face tent.      Vitals: (Last set prior to Anesthesia Care Transfer)    CRNA VITALS  8/25/2017 1108 - 8/25/2017 1146      8/25/2017             Pulse: 74    SpO2: 100 %                Electronically Signed By: OMAR Cox CRNA  August 25, 2017  11:46 AM

## 2017-08-25 NOTE — ANESTHESIA POSTPROCEDURE EVALUATION
Patient: Jennifer Payne    Procedure(s):  Laproscopic assisted JEJUNOILEAL Resection  - Wound Class: I-Clean    Diagnosis:Jejunoileal Diverticulitis   Diagnosis Additional Information: PREOPERATIVE DIAGNOSIS:  Jejunoileal Diverticulitis      POSTOPERATIVE DIAGNOSIS:  Same     PROCEDURE:  Laparoscopic assisted jejunal resection    Anesthesia Type:  General, ETT    Note:  Anesthesia Post Evaluation    Patient location during evaluation: PACU  Patient participation: Able to fully participate in evaluation  Level of consciousness: awake  Pain management: adequate  Airway patency: patent  Cardiovascular status: acceptable  Respiratory status: acceptable  Hydration status: acceptable  PONV: controlled     Anesthetic complications: None          Last vitals:  Vitals:    08/25/17 1207 08/25/17 1215 08/25/17 1230   BP:  142/74 135/83   Resp: 10 12 14   Temp:   97.6  F (36.4  C)   SpO2: 99% 97% 98%         Electronically Signed By: Gaston Phillips MD  August 25, 2017  12:31 PM

## 2017-08-25 NOTE — MR AVS SNAPSHOT
After Visit Summary   8/25/2017    Jennifer Payne    MRN: [id number]           Patient Information     Date Of Birth          1960        Visit Information        Provider Department      8/25/2017 9:45 AM Lucina Campos PA-C; Pj Lenz MD [facility] General Surgery      Today's Diagnoses     ERRONEOUS ENCOUNTER--DISREGARD    -  1       Follow-ups after your visit        Who to contact     If you have questions or need follow up information about today's clinic visit or your schedule please contact [facility] directly at [phone].  Normal or non-critical lab and imaging results will be communicated to you by CoTweethart, letter or phone within 4 business days after the clinic has received the results. If you do not hear from us within 7 days, please contact the clinic through CoTweethart or phone. If you have a critical or abnormal lab result, we will notify you by phone as soon as possible.  Submit refill requests through TagSeats or call your pharmacy and they will forward the refill request to us. Please allow 3 business days for your refill to be completed.          Additional Information About Your Visit        MyChart Information     TagSe[employer] gives you secure access to your electronic health record. If you see a primary care provider, you can also send messages to your care team and make appointments. If you have questions, please call your primary care clinic.  If you do not have a primary care provider, please call [phone] and they will assist you.        Care EveryWhere ID     This is your Care EveryWhere ID. This could be used by other organizations to access your [place] medical records  [id number]         Blood Pressure from Last 3 Encounters:   09/07/17 128/80   08/29/17 137/81   08/14/17 120/88    Weight from Last 3 Encounters:   09/07/17 75.8 kg (167 lb)   08/26/17 76 kg  (167 lb 8 oz)   08/14/17 74.8 kg (165 lb)              Today, you had the following     No orders found for display         Today's Medication Changes      Notice     This visit is during an admission. Changes to the med list made in this visit will be reflected in the After Visit Summary of the admission.             Primary Care Provider Office Phone # Fax #    [facility] [phone]       No address on file        Equal Access to Services     JACK HARMON : Hadii aad ku hadasho Soomaali, waaxda luqadaha, qaybta kaalmada adeegyada, waxay idiin hayshadn delfino al . So [facility] [phone].    ATENCIÓN: Si habla español, tiene a carter disposición servicios gratuitos de asistencia lingüística. Llame al [phone].    We comply with applicable federal civil rights laws and [place] laws. We do not discriminate on the basis of race, color, national origin, age, disability, sex, sexual orientation, or gender identity.            Thank you!     Thank you for choosing [facility]  for your care. Our goal is always to provide you with excellent care. Hearing back from our patients is one way we can continue to improve our services. Please take a few minutes to complete the written survey that you may receive in the mail after your visit with us. Thank you!             Your Updated Medication List - Protect others around you: Learn how to safely use, store and throw away your medicines at www.disposemymeds.org.      Notice     This visit is during an admission. Changes to the med list made in this visit will be reflected in the After Visit Summary of the admission.

## 2017-08-25 NOTE — ANESTHESIA PREPROCEDURE EVALUATION
Anesthesia Evaluation     .             ROS/MED HX    ENT/Pulmonary:       Neurologic:       Cardiovascular:     (+) ----. : . . . :. Irregular Heartbeat/Palpitations, .       METS/Exercise Tolerance:     Hematologic:         Musculoskeletal:         GI/Hepatic:     (+) Other GI/Hepatic diverticulitis      Renal/Genitourinary:         Endo:         Psychiatric:         Infectious Disease:         Malignancy:         Other:                     Physical Exam      Airway   Mallampati: II  TM distance: >3 FB  Neck ROM: full    Dental     Cardiovascular   Rhythm and rate: regular and normal      Pulmonary    breath sounds clear to auscultation                    Anesthesia Plan      History & Physical Review  History and physical reviewed and following examination; no interval change.    ASA Status:  2 .    NPO Status:  > 8 hours    Plan for General and ETT with Intravenous and Propofol induction. Maintenance will be Balanced.    PONV prophylaxis:  Ondansetron (or other 5HT-3) and Dexamethasone or Solumedrol       Postoperative Care  Postoperative pain management:  IV analgesics, Oral pain medications and Multi-modal analgesia.      Consents  Anesthetic plan, risks, benefits and alternatives discussed with:  Patient..                          .

## 2017-08-25 NOTE — IP AVS SNAPSHOT
MRN:[id number]                      After Visit Summary   8/25/2017    Jennifer Payne    MRN: [id number]           Thank you!     Thank you for choosing [facility] for your care. Our goal is always to provide you with excellent care. Hearing back from our patients is one way we can continue to improve our services. Please take a few minutes to complete the written survey that you may receive in the mail after you visit. If you would like to speak to someone directly about your visit please contact Patient Relations at [phone]. Thank you!          Patient Information     Date Of Birth          1960        About your hospital stay     You were admitted on:  August 25, 2017 You last received care in the:  [place] [address] [facility]    You were discharged on:  August 29, 2017        Reason for your hospital stay       SB diverticulitis and infection                  Who to Call     For medical emergencies, please call 911.  For non-urgent questions about your medical care, please call your primary care provider or clinic, [phone]  For questions related to your surgery, please call your surgery clinic        Attending Provider     Provider Specialty    Pj Lenz MD General Surgery       Primary Care Provider Office Phone # Fax #    [facility] [phone]      After Care Instructions     Activity       Your activity upon discharge: activity as tolerated            Diet       Follow this diet upon discharge: Low residue (soft)                  Follow-up Appointments     Follow-up and recommended labs and tests        Follow up with primary care provider, [facility], within 7 days for hospital follow- up.  No follow up labs or test are needed. Surgeon as per plan.                  Further instructions from your care team       HOME CARE FOLLOWING ABDOMINAL SURGERY  CARMENZA Hicks E. Gavin, N.  "CARMENZA Lew R. O Donnell, L. Thomas     RETURN APPOINTMENT:  Schedule a follow-up visit with Dr Lenz 2-3 weeks after surgery.  Office Phone:  [phone]    INCISIONAL CARE:  Replace the bandage over your incision (or incisions) until all drainage stops, or if more comfortable to have in place.  If present, leave the steri-strips (white paper tapes) in place for 14 days after surgery.  Taper abdominal binder use after 1 week; discontinue at 2 weeks.    BATHING:  Avoid baths for 1 week after surgery.  Showers are okay.  You may wash your hair at any time.  Gently pat your incision dry after bathing.    ACTIVITY:  Light Activity -- you may immediately be up and about as tolerated.  Driving -- you may drive when comfortable and off narcotic pain medications.  Light Work -- resume when comfortable off pain medications.  (If you can drive, you probably can work.)  Strenuous Work/Activity -- limit lifting to 20 pounds for 4 weeks.  Then, progressively increase with time.  Active Sports (running, biking, etc.) -- cautiously resume after 6 weeks.    DISCOMFORT:  Use pain medications as prescribed by your surgeon.  Take the pain medication with some food, when possible, to minimize side effects.  Expect gradual improvement.    DIET:  Continue on a \"soft\" diet (i.e. cooked vegetables, soups, processed meats, light/white bread, mashed potatoes, rice, yogurt) for the first one to two weeks after discharge from the hospital.  Drink plenty of fluids.    NAUSEA:  If nauseated from the anesthetic/pain meds; rest in bed, get up cautiously with assistance, and drink clear liquids (juice, tea, broth).     CONTACT US IF THE FOLLOWING DEVELOPS:   1. A fever that is above 101     2. If there is a large amount of drainage, bleeding, or swelling.   3. Severe pain that is not relieved by your prescription.   4. Drainage that is thick, cloudy, yellow, green or white.   5. Any other questions not answered by  Frequently Asked " Questions  sheet.      FREQUENTLY ASKED QUESTIONS:    Q:  How should my incision look?    A:  Normally your incision will appear slightly swollen with light redness directly along the incision itself as it heals.  It may feel like a bump or ridge as the healing/scarring happens, and over time (3-4 months) this bump or ridge feeling should slowly go away.  In general, clear or pink watery drainage can be normal at first as your incision heals, but should decrease over time.    Q:  How do I know if my incision is infected?  A:  Look at your incision for signs of infection, like redness around the incision spreading to surrounding skin, or drainage of cloudy or foul-smelling drainage.  If you feel warm, check your temperature to see if you are running a fever.    **If any of these things occur, please notify the nurse at our office.  We may need you to come into the office for an incision check.      Q:  How do I take care of my incision?  A:  If you have a dressing in place - Starting the day after surgery, replace the dressing 1-2 times a day until there is no further drainage from the incision.  At that time, a dressing is no longer needed.  Try to minimize tape on the skin if irritation is occurring at the tape sites.  If you have significant irritation from tape on the skin, please call the office to discuss other method of dressing your incision.    Small pieces of tape called  steri-strips  may be present directly overlying your incision; these may be removed 10 days after surgery unless otherwise specified by your surgeon.  If these tapes start to loosen at the ends, you may trim them back until they fall off or are removed.      Q:  There is a piece of tape or a sticky  lead  still on my skin.  Can I remove this?  A:  Sometimes the sticky  leads  used for monitoring during surgery or for evaluation in the emergency department are not all removed while you are in the hospital.  These sometimes have a tab or  metal dot on them.  You can easily remove these on your own, like taking off a band-aid.  If there is a gel substance under the  lead , simply wipe/clean it off with a washcloth or paper towel.      Q:  What can I do to minimize constipation (very hard stools, or lack of stools)?  A:  Stay well hydrated.  Increase your dietary fiber intake or take a fiber supplement -with plenty of water.  Walk around frequently.  You may consider an over-the-counter stool-softener.  Your Pharmacist can assist you with choosing one that is stocked at your pharmacy.  Constipation is also one of the most common side effects of pain medication.  If you are using pain medication, be pro-active and try to PREVENT problems with constipation by taking the steps above BEFORE constipation becomes a problem.    Q:  What do I do if I need more pain medications?  A:  Call the office to receive refills.  Be aware that certain pain meds cannot be called into a pharmacy and actually require a paper prescription.  A change may be made in your pain med as you progress thru your recovery period or if you have side effects to certain meds.    --Pain meds are NOT refilled after 5pm on weekdays, and NOT AT ALL on the weekends, so please look ahead to prevent problems.      Q:  Why am I having a hard time sleeping now that I am at home?  A:  Many medications you receive while you are in the hospital can impact your sleep for a number of days after your surgery/hospitalization.  Decreased level of activity and naps during the day may also make sleeping at night difficult.  Try to minimize day-time naps, and get up frequently during the day to walk around your home during your recovery time.  Sleep aides may be of some help, but are not recommended for long-term use.      Q:  I am having some back discomfort.  What should I do?  A:  This may be related to certain positioning that was required for your surgery, extended periods of time in bed, or other  changes in your overall activity level.  You may try ice, heat, acetaminophen, or ibuprofen to treat this temporarily.  Note that many pain medications have acetaminophen in them and would state this on the prescription bottle.  Be sure not to exceed the maximum of 4000mg per day of acetaminophen.     **If the pain you are having does not resolve, is severe, or is a flare of back pain you have had on other occasions prior to surgery, please contact your primary physician for further recommendations or for an appointment to be examined at their office.    Q:  Why am I having headaches?  A:  Headaches can be caused by many things:  caffeine withdrawal, use of pain meds, dehydration, high blood pressure, lack of sleep, over-activity/exhaustion, flare-up of usual migraine headaches.  If you feel this is related to muscle tension (a band-like feeling around the head, or a pressure at the low-back of the head) you may try ice or heat to this area.  You may need to drink more fluids (try electrolyte drink like Gatorade), rest, or take your usual migraine medications.   **If your headaches do not resolve, worsen, are accompanied by other symptoms, or if your blood pressure is high, please call your primary physician for recommendation and/or examination.    Q:  I am unable to urinate.  What do I do?  A:  A small percentage of people can have difficulty urinating initially after surgery.  This includes being able to urinate only a very small amount at a time and feeling discomfort or pressure in the very low abdomen.  This is called  urinary retention , and is actually an urgent situation.  Proceed to your nearest Emergency department for evaluation (not an Urgent Care Center).  Sometimes the bladder does not work correctly after certain medications you receive during surgery, or related to certain procedures.  You may need to have a catheter placed until your bladder recovers.  When planning to go to an Emergency  "department, it may help to call the ER to let them know you are coming in for this problem after a surgery.  This may help you get in quicker to be evaluated.  **If you have symptoms of a urinary tract infection, please contact your primary physician for the proper evaluation and treatment.          If you have other questions, please call the office Monday thru Friday between 8am and 5pm to discuss with the nurse or physician assistant.  #[phone]    There is a surgeon ON CALL on weekday evenings and over the weekend in case of urgent need only, and may be contacted at the same number.    If you are having an emergency, call 911 or proceed to your nearest emergency department.        Pending Results     No orders found from 8/23/2017 to 8/26/2017.            Statement of Approval     Ordered          08/29/17 1249  I have reviewed and agree with all the recommendations and orders detailed in this document.  EFFECTIVE NOW     Approved and electronically signed by:  Royce Hernandez MD           08/29/17 0274  I have reviewed and agree with all the recommendations and orders detailed in this document.  EFFECTIVE NOW     Approved and electronically signed by:  Lidia Desai PA-C             Admission Information     Date & Time Provider Department Dept. Phone    8/25/2017 Pj Lenz MD [place] [address] [facility] [phone]      Your Vitals Were     Blood Pressure Temperature Respirations Height Weight Pulse Oximetry    137/81 (BP Location: Right arm) 97.6  F (36.4  C) (Oral) 16 1.727 m (5' 8\") 76 kg (167 lb 8 oz) 98%    BMI (Body Mass Index)                   25.47 kg/m2           MyChart Information     [employer] gives you secure access to your electronic health record. If you see a primary care provider, you can also send messages to your care team and make appointments. If you have questions, please call your primary care clinic.  If you do not have a primary care provider, please call " [phone] and they will assist you.        Care EveryWhere ID     This is your Care EveryWhere ID. This could be used by other organizations to access your [facility] medical records  [id number]        Equal Access to Services     JACK SALGUERO: Hadii aad ku hadimeldajose Makayla, waaxda luqadaha, qaybta kaalmada stew, ramila dongin hayaacade saleh aditimike salguero. So [facility] [phone].    ATENCIÓN: Si habla español, tiene a carter disposición servicios gratuitos de asistencia lingüística. Llame al [phone].    We comply with applicable federal civil rights laws and [place] laws. We do not discriminate on the basis of race, color, national origin, age, disability sex, sexual orientation or gender identity.               Review of your medicines      CONTINUE these medicines which have NOT CHANGED        Dose / Directions    multivitamin, therapeutic Tabs tablet        Dose:  1 tablet   Take 1 tablet by mouth daily   Refills:  0       Vitamin D (Cholecalciferol) 1000 UNITS Caps        Dose:  1000 Units   Take 1,000 Units by mouth daily   Refills:  0                Protect others around you: Learn how to safely use, store and throw away your medicines at www.disposemymeds.org.             Medication List: This is a list of all your medications and when to take them. Check marks below indicate your daily home schedule. Keep this list as a reference.      Medications           Morning Afternoon Evening Bedtime As Needed    multivitamin, therapeutic Tabs tablet   Take 1 tablet by mouth daily   Next Dose Due:  Not given during this hospitalization                                   Vitamin D (Cholecalciferol) 1000 UNITS Caps   Take 1,000 Units by mouth daily   Next Dose Due:  Not given during this hospitlization                                             More Information        Discharge Instructions: Eating a Soft Diet  You have been prescribed a soft diet (also called gastrointestinal soft diet or bland diet). This  reduces the amount of work your digestive tract has to do. It also reduces the chance that your digestive tract will be irritated by the food you eat. A soft diet is prescribed for people with digestive problems. The diet consists of foods that are tender, mildly seasoned, and easy to digest. While on this diet, you should not eat fried or spicy foods, or raw fruits and vegetables. Also avoid alcoholic beverages.  General guidelines    Eat in a calm, relaxed atmosphere. How you eat may be as important as what you eat. Don t rush while eating. Chew your food slowly and thoroughly, and swallow slowly.    Eat small frequent meals throughout the day, but don t eat within 2 hours of bedtime.    Avoid any foods that cause discomfort.    Don t use NSAIDs (nonsteroidal anti-inflammatory drugs), such as aspirin, and ibuprofen. Also avoid medicine that contain aspirin. NSAIDs can cause ulcers and delay or prevent ulcer healing.    Use antacids as needed, but keep in mind that magnesium-containing antacids may cause diarrhea.  Foods to eat    Cream of wheat and cream of rice    Cooked white rice    Mashed potatoes, and boiled potatoes without skin    Plain pasta and noodles    Plain white crackers (such as no-salt soda crackers)    White bread    Applesauce    Cooked fruits without skins or seeds    Mild juices, such as apple and grape    Bananas    Cooked or mashed vegetables without stems and seeds    Carrots    Summer squash (zucchini, yellow squash)    Winter squash (acorn, butternut, spaghetti squash)    Cottage cheese    Mild hard or soft cheeses    Custard    Yogurt without seeds or nuts    Milk (you may need lactose-free milk)    Ice cream without seeds or nuts    Smooth peanut butter    Eggs    Fish, turkey, chicken, or other meat that is not tough or stringy    Tofu  Foods to avoid    Nuts and seeds    Snack foods, such as the following:    Chocolate-containing snacks, candy, pastries, or cakes.    Potato chips  (plain, barbecued, or other flavors)    Taco chips or nachos    Corn chips    Popcorn, popcorn cakes, or rice cakes    Crackers with nuts, seeds, or spicy seasonings    French fries    Fried or greasy foods    Whole-grain breads, rolls, and crackers    Breads and rolls with nuts, seeds, or bran    Bran and granola cereals    Berries with seeds, such as strawberries, raspberries, and blackberries    Acidic fruits, such as oranges, grapefruits, ashleigh, limes, and pineapples    Raw vegetables    Mild or hot peppers    Sauerkraut and pickled vegetables    Tomatoes or tomato products, such as tomato paste, tomato sauce, and tomato juice    Barbecue sauce    Spicy or flavored cheeses, such as jalapeño and black pepper cheese    Crunchy peanut butter    Dried cooked beans, such as clayton, kidney, or navy beans    The following meats:    Fried or greasy meats    Processed, spicy meats, such as sausage, lovelace, ham, and lunch meats    Ribs and other meats with barbecue sauce    Tough or stringy meats, such as corned beef or beef jerky  Fluids to avoid    Alcoholic beverages    Coffee and regular teas    Daisha and other drinks with caffeine    Cranberry, orange, pineapple, and grapefruit juice    Lemonade    Vegetable juice    Whole milk, if you are lactose intolerant  Follow-up  Make a follow-up appointment with a dietitian as directed by our staff.  Date Last Reviewed: 6/21/2015 2000-2017 [employer]. [address] [place] [address], [place] [address]. All rights reserved. This information is not intended as a substitute for professional medical care. Always follow your healthcare professional's instructions.

## 2017-08-25 NOTE — PLAN OF CARE
"Problem: Goal Outcome Summary  Goal: Goal Outcome Summary  Outcome: No Change  Surgical admit this shift  VS /73  Temp 97.3  F (36.3  C) (Axillary)  Resp 16  Ht 1.727 m (5' 8\")  SpO2 100%  Lung sounds clear  O2 room air  Bowel sounds not audible  Ice chips only  IVF Maintenance fluids infusing  Dressings marked drainage extended from previous marking  CMS intact  Drains romero patent with adequate output  Activity bedrest this shift  Pain denies need for pain medication  Patient/family centered care spouse at bedside following surgery             "

## 2017-08-26 LAB — GLUCOSE BLDC GLUCOMTR-MCNC: 85 MG/DL (ref 70–99)

## 2017-08-26 PROCEDURE — 00000146 ZZHCL STATISTIC GLUCOSE BY METER IP

## 2017-08-26 PROCEDURE — S5010 5% DEXTROSE AND 0.45% SALINE: HCPCS | Performed by: INTERNAL MEDICINE

## 2017-08-26 PROCEDURE — 99222 1ST HOSP IP/OBS MODERATE 55: CPT | Performed by: INTERNAL MEDICINE

## 2017-08-26 PROCEDURE — 25000125 ZZHC RX 250: Performed by: SURGERY

## 2017-08-26 PROCEDURE — 25000128 H RX IP 250 OP 636: Performed by: SURGERY

## 2017-08-26 PROCEDURE — 99207 ZZC CDG-CODE CATEGORY CHANGED: CPT | Performed by: INTERNAL MEDICINE

## 2017-08-26 PROCEDURE — 25800025 ZZH RX 258: Performed by: INTERNAL MEDICINE

## 2017-08-26 PROCEDURE — 12000000 ZZH R&B MED SURG/OB

## 2017-08-26 RX ADMIN — ACETAMINOPHEN 1000 MG: 10 INJECTION, SOLUTION INTRAVENOUS at 17:26

## 2017-08-26 RX ADMIN — DEXTROSE AND SODIUM CHLORIDE: 5; 450 INJECTION, SOLUTION INTRAVENOUS at 13:09

## 2017-08-26 RX ADMIN — DEXTROSE AND SODIUM CHLORIDE: 5; 450 INJECTION, SOLUTION INTRAVENOUS at 22:20

## 2017-08-26 RX ADMIN — Medication 0.3 MG: at 06:10

## 2017-08-26 RX ADMIN — Medication 0.3 MG: at 14:38

## 2017-08-26 RX ADMIN — ACETAMINOPHEN 1000 MG: 10 INJECTION, SOLUTION INTRAVENOUS at 23:55

## 2017-08-26 RX ADMIN — HEPARIN SODIUM 5000 UNITS: 5000 INJECTION, SOLUTION INTRAVENOUS; SUBCUTANEOUS at 13:09

## 2017-08-26 RX ADMIN — HEPARIN SODIUM 5000 UNITS: 5000 INJECTION, SOLUTION INTRAVENOUS; SUBCUTANEOUS at 06:10

## 2017-08-26 RX ADMIN — ACETAMINOPHEN 1000 MG: 10 INJECTION, SOLUTION INTRAVENOUS at 03:27

## 2017-08-26 RX ADMIN — Medication 0.3 MG: at 10:39

## 2017-08-26 RX ADMIN — PANTOPRAZOLE SODIUM 40 MG: 40 INJECTION, POWDER, FOR SOLUTION INTRAVENOUS at 13:09

## 2017-08-26 RX ADMIN — SODIUM CHLORIDE: 9 INJECTION, SOLUTION INTRAVENOUS at 08:55

## 2017-08-26 RX ADMIN — ERTAPENEM SODIUM 1 G: 1 INJECTION, POWDER, LYOPHILIZED, FOR SOLUTION INTRAMUSCULAR; INTRAVENOUS at 08:43

## 2017-08-26 RX ADMIN — HEPARIN SODIUM 5000 UNITS: 5000 INJECTION, SOLUTION INTRAVENOUS; SUBCUTANEOUS at 22:15

## 2017-08-26 ASSESSMENT — PAIN DESCRIPTION - DESCRIPTORS
DESCRIPTORS: TENDER
DESCRIPTORS: SORE;SHARP

## 2017-08-26 NOTE — PLAN OF CARE
Problem: Goal Outcome Summary  Goal: Goal Outcome Summary  POD 1 from lap jejunal resection. BS hypo but not passing gas. Midline incision with steristrips- new island dressing placed. Remains NPO with ice. Ambulated halls x2. Using IS. SBA when up. IVF @ 100ml/hr. Abd binder on for comfort. 1 dose of IV dilaudid for pain. Ice to incision. Newman out this morning. Voiding adequately.

## 2017-08-26 NOTE — CONSULTS
[facility]  Hospitalist Consult Note  Name: Jennifer Payne    MRN: [id number]  YOB: 1960    Age: 57 year old  Date of admission: 8/25/2017  Primary care provider: [facility], [place]     Requesting Physician:  Dr Lenz of General surgery  Reason for consult:  Post-operative medical management         Assessment and Plan:   Jennifer Payne is a 57 year old female with a history of recent hospitalization for acute jejunal diverticulitis who was admitted for  Laparoscopic assisted jejunal resection    1. Post operative state s/p Laparoscopic assisted jejunal resection with prior hx of Jejunal diverticulitis. Currently recovering on the normal trajectory, pain under control, no nausea/vomiting. Awaiting bowel function resumption. Afebrile. Newman cath removed today. Continue IVF while on NPO status.    Will defer diet, activity, DVT prophylaxis, and pain control to the primary team.     - she has no prior chronic medical condition. Check lytes if remained NPO in am  - change IVF to D5 0.45 while NPO. No prior hx of DM    Code status: full  Prophylaxis: on heparin SQ, ambulate as much as she can  Disposition: home    Thank you for the consultation, we will continue to follow along during the hospitalization. Please page with any questions or concerns.         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 Recent she was hospitalized for acute diverticulitis.Pre-operative note was fully reviewed and recommendations acknowledged. Op note and anesthesia notes and flow sheets reviewed.     The patient had no complications related to the procedure and has had an unremarkable post-operative course to this point. Currently pain is adequately controlled. No nausea, vomiting, diarrhea or constipation. No fevers, chills, diaphoresis. No chest pain,  "palpitations, dyspnea. No excessive somnolence and patient is fully alert and oriented. The patient has no other complaints at this time.                Past Medical History:     Past Medical History:   Diagnosis Date     Diverticula of intestine      Irregular heart beat     Benign arrythmia             Past Surgical History:     Past Surgical History:   Procedure Laterality Date     ABDOMEN SURGERY       GYN SURGERY      hysterectomy               Social History:     Social History   Substance Use Topics     Smoking status: Never Smoker     Smokeless tobacco: Never Used     Alcohol use Yes      Comment: \"not very much\"             Family History:   Family history was fully reviewed and non-contributory in this case.          Allergies:   No Known Allergies          Medications:     Prior to Admission medications    Medication Sig Last Dose Taking? Auth Provider   Vitamin D, Cholecalciferol, 1000 UNITS CAPS Take 1,000 Units by mouth daily 8/24/2017 at Unknown time Yes Reported, Patient   multivitamin, therapeutic (THERA-VIT) TABS tablet Take 1 tablet by mouth daily 8/24/2017 at Unknown time Yes Reported, Patient       Current hospital administered medication list (MAR) also reviewed.          Review of Systems:   A comprehensive greater than 10 system review of systems was carried out.  Pertinent positives and negatives are noted above.  Otherwise negative for contributory info.            Physical Exam:   Blood pressure 125/69, temperature 98.8  F (37.1  C), temperature source Oral, resp. rate 16, height 1.727 m (5' 8\"), weight 76 kg (167 lb 8 oz), SpO2 98 %, not currently breastfeeding.  Exam:  GENERAL: No apparent distress. Awake, alert, and fully oriented.  HEENT: Normocephalic, atraumatic. Extraocular movements intact.  CARDIOVASCULAR: Regular rate and rhythm without murmurs or rubs. No S3.  PULMONARY: Clear bilaterally.  ABDOMINAL: Soft, not distended, tenderness on the incision site, no bleeding. No " guarding  EXTREMITIES: No cyanosis or clubbing. No edema.  NEUROLOGICAL: CN 2-12 grossly intact, no focal neurological deficits.  DERMATOLOGICAL: No rash, ulcer, ecchymoses, jaundice.         Data:   Imaging:  Reviewed.    EKG/Telemetry: none seen  Labs: Reviewed.     Recent Labs  Lab 08/25/17  1515        No results for input(s): NA, POTASSIUM, CHLORIDE, CO2, ANIONGAP, GLC, BUN, CR, GFRESTIMATED, GFRESTBLACK, BRANDI in the last 168 hours.    Recent Labs  Lab 08/26/17  0614   BGM 85

## 2017-08-26 NOTE — CONSULTS
"CLINICAL NUTRITION SERVICES  -  ASSESSMENT NOTE    Future/Additional Recommendations:   Recommend high protein supplements once able   Malnutrition:   Patient does not meet two of the above criteria necessary for diagnosing malnutrition     REASON FOR ASSESSMENT  Jennifer Payne is a 57 year old female seen by Registered Dietitian for Admission Nutrition Risk Screen - unintentional weight loss of 10# or more in the past 2 mos (pt  Has lost 12 lbs since hospital admission 7/29).    NUTRITION HISTORY  - Information obtained from patient/spouse in room.   - Over the past month Alyssa had started out on full liquids, then graduated back up to a low fiber diet (pt had been seen by RD prior to d/c last month for Low Fiber diet ed). She was eating 3-4 times daily, generally 3 meals and one snack.    Breakfast: cream of wheat, juice (V8 Fusion), or Greek yogurt   Am snack: saltines/pretzels   Lunch: cream of potato strained, white bread chicken sandwich   Dinner: eggs, spaghetti with smooth yessy or tomato sauce  - Protein foods included eggs, chicken, yogurt, tuna    - Alyssa had continue to walk with her spouse regularly, however they were not as long as usual. She was able to keep up some physical activity routine but felt weaker than usual as she was eating <75% of normal.     CURRENT NUTRITION ORDERS   Diet Order:     NPO post-op - ice only    Current Intake/Tolerance:  Per RN, bowel sounds not audible    PHYSICAL FINDINGS  Observed  No nutrition-related physical findings observed  Obtained from Chart/Interdisciplinary Team  Jejunal resection 8/25    ANTHROPOMETRICS  Height: 5' 8\"  Weight: 167 lbs 8 oz (76 kg)  Body mass index is 25.47 kg/(m^2).  Weight Status:  Overweight BMI 25-29.9  IBW: 63.6 kg  % IBW: 119%  Weight History: Pt reports UBW of 175# - loss of at least 6# x1 month (3%).   Wt Readings from Last 10 Encounters:   08/26/17 76 kg (167 lb 8 oz)   08/14/17 74.8 kg (165 lb)   07/24/17 78.5 kg (173 lb 1.6 " oz)       LABS  Labs reviewed    MEDICATIONS  Medications reviewed  NaCl IVF @ 100 ml/hr    Dosing Weight 76 kg    ASSESSED NUTRITION NEEDS PER APPROVED PRACTICE GUIDELINES:  Estimated Energy Needs: 9597-9490 kcals (25-30 Kcal/Kg)  Justification: overweight  Estimated Protein Needs:  grams protein (1.2-1.5 g pro/Kg)  Justification: maintenance  Estimated Fluid Needs: 7651-4247 mL (1 mL/Kcal)  Justification: maintenance    MALNUTRITION:  % Weight Loss:  Weight loss does not meet criteria for malnutrition  % Intake:  </= 75% for >/= 1 month (severe malnutrition)  Subcutaneous Fat Loss:  None observed  Muscle Loss:  None observed  Fluid Retention:  None noted    Malnutrition Diagnosis: Patient does not meet two of the above criteria necessary for diagnosing malnutrition    NUTRITION DIAGNOSIS:  Inadequate oral intake related to inability for PO s/p bowel surgery as evidenced by NPO x2 days while awaiting return of bowel fxn, <75% normal intakes, 3% loss x1 month.     NUTRITION INTERVENTIONS  Recommendations / Nutrition Prescription  ADAT per surgery   Obtain new weight - appreciate nursing help  Recommend medical food supplements once able    Implementation  Nutrition education: Provided education on role of RD, supplements once appropriate.       Nutrition Goals  Pt to tolerate diet advancement >CLD in the next 48 hours.       MONITORING AND EVALUATION:  Progress towards goals will be monitored and evaluated per protocol and Practice Guidelines    Chuyita Baird RD, LD  3rd floor/ICU: [phone]  All other floors: [phone]  Weekend/holiday: [phone]  Office: [phone]

## 2017-08-26 NOTE — PROGRESS NOTES
"[facility]   General Surgery Progress Note           Assessment and Plan:   Assessment:   POD#1 s/p Procedure(s):  Laproscopic assisted JEJUNOILEAL Resection  - Wound Class: I-Clean        Plan:   -await return of bowel function  DVT and stress ulcer prevention         Interval History:   Doing well, pain tolerable  Reviewed photos with her         Physical Exam:   Blood pressure 125/69, temperature 98.8  F (37.1  C), temperature source Oral, resp. rate 16, height 1.727 m (5' 8\"), weight 76 kg (167 lb 8 oz), SpO2 97 %, not currently breastfeeding.    I/O last 3 completed shifts:  In: 4071 [P.O.:50; I.V.:4021]  Out: 935 [Urine:925; Blood:10]    Abdomen:   soft, non-distended, tenderness noted at incision and no masses palpated   Inc(s) - clean, dry, intact                Data:     Recent Labs   Lab Test  07/28/17   0650  07/27/17   0720  07/26/17   0633   HGB  11.2*  11.6*  12.0   WBC  7.9  13.6*  19.3*       Pj Lenz MD     "

## 2017-08-27 PROCEDURE — 99232 SBSQ HOSP IP/OBS MODERATE 35: CPT | Performed by: INTERNAL MEDICINE

## 2017-08-27 PROCEDURE — 25000125 ZZHC RX 250: Performed by: SURGERY

## 2017-08-27 PROCEDURE — 25800025 ZZH RX 258: Performed by: INTERNAL MEDICINE

## 2017-08-27 PROCEDURE — 12000000 ZZH R&B MED SURG/OB

## 2017-08-27 PROCEDURE — S5010 5% DEXTROSE AND 0.45% SALINE: HCPCS | Performed by: INTERNAL MEDICINE

## 2017-08-27 PROCEDURE — 25000132 ZZH RX MED GY IP 250 OP 250 PS 637: Performed by: SURGERY

## 2017-08-27 PROCEDURE — 25000128 H RX IP 250 OP 636: Performed by: SURGERY

## 2017-08-27 RX ORDER — BISACODYL 10 MG
10 SUPPOSITORY, RECTAL RECTAL DAILY PRN
Status: DISCONTINUED | OUTPATIENT
Start: 2017-08-27 | End: 2017-08-29 | Stop reason: HOSPADM

## 2017-08-27 RX ADMIN — DEXTROSE AND SODIUM CHLORIDE: 5; 450 INJECTION, SOLUTION INTRAVENOUS at 08:17

## 2017-08-27 RX ADMIN — BISACODYL 10 MG: 10 SUPPOSITORY RECTAL at 08:14

## 2017-08-27 RX ADMIN — HEPARIN SODIUM 5000 UNITS: 5000 INJECTION, SOLUTION INTRAVENOUS; SUBCUTANEOUS at 22:01

## 2017-08-27 RX ADMIN — HEPARIN SODIUM 5000 UNITS: 5000 INJECTION, SOLUTION INTRAVENOUS; SUBCUTANEOUS at 14:02

## 2017-08-27 RX ADMIN — DEXTROSE AND SODIUM CHLORIDE: 5; 450 INJECTION, SOLUTION INTRAVENOUS at 20:21

## 2017-08-27 RX ADMIN — ACETAMINOPHEN 1000 MG: 10 INJECTION, SOLUTION INTRAVENOUS at 12:42

## 2017-08-27 RX ADMIN — ACETAMINOPHEN 1000 MG: 10 INJECTION, SOLUTION INTRAVENOUS at 06:39

## 2017-08-27 RX ADMIN — PANTOPRAZOLE SODIUM 40 MG: 40 INJECTION, POWDER, FOR SOLUTION INTRAVENOUS at 14:02

## 2017-08-27 RX ADMIN — HEPARIN SODIUM 5000 UNITS: 5000 INJECTION, SOLUTION INTRAVENOUS; SUBCUTANEOUS at 06:38

## 2017-08-27 NOTE — PROGRESS NOTES
"[facility]  Hospitalist Progress Note  Dawson Prather MD, MD 08/27/2017  (Text Page)  Reason for Stay (Diagnosis): post op state         Assessment and Plan:      Summary of Stay: Jennifer Payne is a 57 year old female with a history of recent hospitalization for acute jejunal diverticulitis who was admitted for  Laparoscopic assisted jejunal resection     1. Post operative state s/p Laparoscopic assisted jejunal resection with prior hx of Jejunal diverticulitis. Currently recovering on the normal trajectory, pain under control, no nausea/vomiting. She is not needing any narcotics. Now with flatus and BM      - she has no prior chronic medical condition.   - BP on the higher side this morning. Not starting any anti-hypertensive for now.   - if continues to tolerate liquid diet then may discontinue IVF.  - decrease rate for now    DVT Prophylaxis: Heparin SQ  Code Status: Full Code  Discharge Dispo: home  Estimated Disch Date / # of Days until Disch: defer to primary, suspect in 24-36 hours        Interval History (Subjective):      Continuing care today  Seen and examined  Feels much better. Tolerating clears this morning. Had flatus and BM today.  Remained afebrile.                    Physical Exam:      Last Vital Signs:  BP (!) 150/93 (BP Location: Left arm)  Temp 98.7  F (37.1  C) (Oral)  Resp 16  Ht 1.727 m (5' 8\")  Wt 76 kg (167 lb 8 oz)  SpO2 100%  BMI 25.47 kg/m2    I/O last 3 completed shifts:  In: 2641 [P.O.:150; I.V.:2491]  Out: 2650 [Urine:2650]  Wt Readings from Last 1 Encounters:   08/26/17 76 kg (167 lb 8 oz)     Vitals:    08/26/17 0846   Weight: 76 kg (167 lb 8 oz)       Constitutional: Awake, alert, cooperative, no apparent distress   Respiratory: Clear to auscultation bilaterally, no crackles or wheezing   Cardiovascular: Regular rate and rhythm, normal S1 and S2, and no murmur noted   Abdomen: Normal bowel sounds, soft, non-distended, mild tender at the incision "   Skin: No rashes, no cyanosis, dry to touch   Neuro: Alert and oriented x3, no weakness, spontaneous and coherent speech   Extremities: No edema, normal range of motion   Other(s): Euthymic mood, not agitated       All other systems: Negative          Medications:      All current medications were reviewed with changes reflected in problem list.         Data:      All new lab and imaging data was reviewed.   Labs:  No results for input(s): CULT in the last 168 hours.  No results for input(s): NA, POTASSIUM, CHLORIDE, CO2, ANIONGAP, GLC, BUN, CR, GFRESTIMATED, GFRESTBLACK, BRANDI in the last 168 hours.    Recent Labs  Lab 08/25/17  1515          Recent Labs  Lab 08/26/17  0614   BGM 85      Imaging:   Results for orders placed or performed during the hospital encounter of 07/24/17   CT Abdomen Pelvis w Contrast    Narrative    CT ABDOMEN AND PELVIS WITH CONTRAST   7/24/2017 12:10 PM     HISTORY: Left lower quadrant pain. Failed outpatient management for  presumed diverticulitis.    COMPARISON: None.    TECHNIQUE: Following the uneventful administration of 85 mL Isovue-370  intravenous contrast, helical sections were acquired from the top of  the diaphragm through the pubic symphysis. Coronal reconstructions  were generated. Radiation dose for this scan was reduced using  automated exposure control, adjustment of the mA and/or kV according  to the patient's size, or iterative reconstruction technique.    FINDINGS:     Abdomen: Two subcentimeter low-attenuation lesions in the liver, too  small to characterize. The spleen, pancreas, adrenal glands and right  kidney are unremarkable. Several left renal peripelvic cysts. The  gallbladder is present. Small hiatal hernia. No enlarged lymph nodes  in the upper abdomen.    Scan through the lower chest is unremarkable.    Pelvis: The small and large bowel are normal in caliber. The appendix  is likely visualized and unremarkable. Several diverticula are present  within  the colon and also a few scattered within the small bowel. A 6  cm diverticulum is present in the jejunum in the upper left  hemiabdomen (series 2 image 34 and series 3 image 48). The jejunum at  this location is mildly thick-walled. Moderate edema is present within  the mesenteric fat about this diverticulum. These findings are  consistent with jejunal diverticulitis. No extraluminal gas or  loculated fluid collections in the abdomen or pelvis. The uterus is  not visualized. No enlarged lymph nodes in the pelvis. A very small  amount of free fluid in the pelvis.      Impression    IMPRESSION: Diverticulitis of a giant 6 cm diverticulum in the jejunum  in the upper left hemiabdomen. There is a moderate amount of edema  within the surrounding mesenteric fat. No visualized extraluminal gas  or abscess.    GIA VERMA MD

## 2017-08-27 NOTE — PLAN OF CARE
Problem: Goal Outcome Summary  Goal: Goal Outcome Summary  Outcome: No Change  VSS.  Reports pain 4/10 to her abd, IV ofirmev x2.  Bowel sounds active, passing flatus. 1 small, hard stool piece this am.  Dressing to midline c/d/i.  NPO, ice.  Voiding adequate.  Up ambulating independently.  Will monitor.

## 2017-08-27 NOTE — PLAN OF CARE
Problem: Goal Outcome Summary  Goal: Goal Outcome Summary  Outcome: Improving     /93, recheck 132/82. Pain managed with IV tylenol. Incision WDL. Suppository given. +bs +gas +bm. Tolerating clear liquid diet. IVF infusing. Voiding. Ambulated in hallway multiple times. Will continue to monitor.

## 2017-08-27 NOTE — PROGRESS NOTES
"[facility]   General Surgery Progress Note           Assessment and Plan:   Assessment:   POD#2 s/p Procedure(s):  Laproscopic assisted JEJUNOILEAL Resection  - Wound Class: I-Clean        Plan:   -IV fluids continued  -stress ulcer prophylaxis and prophylaxis against venous thromboembolism  -Diet advanced -start clears slowly         Interval History:   Had BM yesterday, passing some flatus         Physical Exam:   Blood pressure 140/77, temperature 97.8  F (36.6  C), temperature source Oral, resp. rate 16, height 1.727 m (5' 8\"), weight 76 kg (167 lb 8 oz), SpO2 98 %, not currently breastfeeding.    I/O last 3 completed shifts:  In: 2641 [P.O.:150; I.V.:2491]  Out: 2650 [Urine:2650]    Abdomen:   soft, non-distended, tenderness noted at incsion    Inc(s) - clean, dry, intact                Data:     Recent Labs   Lab Test  07/28/17   0650  07/27/17   0720  07/26/17   0633   HGB  11.2*  11.6*  12.0   WBC  7.9  13.6*  19.3*       Pj Lenz MD     "

## 2017-08-28 LAB
COPATH REPORT: NORMAL
PLATELET # BLD AUTO: 222 10E9/L (ref 150–450)

## 2017-08-28 PROCEDURE — 25800025 ZZH RX 258: Performed by: INTERNAL MEDICINE

## 2017-08-28 PROCEDURE — S5010 5% DEXTROSE AND 0.45% SALINE: HCPCS | Performed by: INTERNAL MEDICINE

## 2017-08-28 PROCEDURE — 85049 AUTOMATED PLATELET COUNT: CPT | Performed by: SURGERY

## 2017-08-28 PROCEDURE — 99207 ZZC CDG-MDM COMPONENT: MEETS LOW - DOWN CODED: CPT | Performed by: HOSPITALIST

## 2017-08-28 PROCEDURE — 25000132 ZZH RX MED GY IP 250 OP 250 PS 637: Performed by: SURGERY

## 2017-08-28 PROCEDURE — 12000000 ZZH R&B MED SURG/OB

## 2017-08-28 PROCEDURE — 25000128 H RX IP 250 OP 636: Performed by: SURGERY

## 2017-08-28 PROCEDURE — 36415 COLL VENOUS BLD VENIPUNCTURE: CPT | Performed by: SURGERY

## 2017-08-28 PROCEDURE — 99231 SBSQ HOSP IP/OBS SF/LOW 25: CPT | Performed by: HOSPITALIST

## 2017-08-28 RX ORDER — PANTOPRAZOLE SODIUM 40 MG/1
40 TABLET, DELAYED RELEASE ORAL EVERY MORNING
Status: DISCONTINUED | OUTPATIENT
Start: 2017-08-28 | End: 2017-08-29 | Stop reason: HOSPADM

## 2017-08-28 RX ORDER — ACETAMINOPHEN 500 MG
1000 TABLET ORAL EVERY 6 HOURS PRN
Status: DISCONTINUED | OUTPATIENT
Start: 2017-08-28 | End: 2017-08-29 | Stop reason: HOSPADM

## 2017-08-28 RX ADMIN — HEPARIN SODIUM 5000 UNITS: 5000 INJECTION, SOLUTION INTRAVENOUS; SUBCUTANEOUS at 05:06

## 2017-08-28 RX ADMIN — DEXTROSE AND SODIUM CHLORIDE: 5; 450 INJECTION, SOLUTION INTRAVENOUS at 09:03

## 2017-08-28 RX ADMIN — HEPARIN SODIUM 5000 UNITS: 5000 INJECTION, SOLUTION INTRAVENOUS; SUBCUTANEOUS at 13:58

## 2017-08-28 RX ADMIN — PANTOPRAZOLE SODIUM 40 MG: 40 TABLET, DELAYED RELEASE ORAL at 11:39

## 2017-08-28 RX ADMIN — ACETAMINOPHEN 1000 MG: 10 INJECTION, SOLUTION INTRAVENOUS at 00:02

## 2017-08-28 RX ADMIN — HEPARIN SODIUM 5000 UNITS: 5000 INJECTION, SOLUTION INTRAVENOUS; SUBCUTANEOUS at 21:57

## 2017-08-28 NOTE — PROGRESS NOTES
"[facility]   General Surgery Progress Note           Assessment and Plan:   Assessment:   POD#3 s/p Laparoscopic assisted jejunal resection   Pathology pending      Plan:   -Diet advanced: full liquids  -IV fluids, weaning  -stress ulcer prophylaxis and prophylaxis against venous thromboembolism  -Await improved PO intake and more consistent bowel activity.         Interval History:   Comfortable in bed.  Tolerated clear liquids well this am.  +flatus today.  Had BMs yesterday after suppository.           Physical Exam:   Blood pressure 136/87, temperature 97.5  F (36.4  C), temperature source Oral, resp. rate 20, height 1.727 m (5' 8\"), weight 76 kg (167 lb 8 oz), SpO2 98 %, not currently breastfeeding.    I/O last 3 completed shifts:  In: 3146 [P.O.:1120; I.V.:2026]  Out: 2900 [Urine:2900]    Abdomen:   soft, non-distended, tenderness noted at incsion    Inc - dressing and steri-strips clean, dry, intact, no erythema            Data:     Recent Labs  Lab 08/28/17  0819 08/25/17  1515    158     Lidia Desai PA-C     As above, recovery progressing  Path pending at this time  Hoping for DC tomorrow  Pj Lenz MD  8/28/2017 4:32 PM    "

## 2017-08-28 NOTE — PLAN OF CARE
Problem: Goal Outcome Summary  Goal: Goal Outcome Summary  Outcome: Improving  Ambulatory Status:  Pt up ind.  Pain:  denies  Resp: LS clear.  GI:  denies nausea.  [place]. Full liquid diet. +BS.  Passing flatus.  Last BM 8/27.  Skin:  CDI-dressing changed today after shower   Disposition:  tbd

## 2017-08-28 NOTE — PLAN OF CARE
Problem: Goal Outcome Summary  Goal: Goal Outcome Summary  Outcome: No Change  VSS.  Iv ofirmev x1 for 2/10 pain to her abd.  Tolerating clears, no nausea.  Bowel sounds active, passing gas.  Midline dressing c/d/i.  Up independent.  Will monitor.

## 2017-08-28 NOTE — PROGRESS NOTES
[facility]    Hospitalist Progress Note    Date of Service (when I saw the patient): 08/28/2017  Provider:  Royce Hernandez MD     Initial presenting complaint/issue to hospital (Diagnosis): post op management  Assessment & Plan   Summary of Stay: Jennifer Payne is a 57 year old female with a history of recent hospitalization for acute jejunal diverticulitis who was admitted for  Laparoscopic assisted jejunal resection      1. Post operative state s/p Laparoscopic assisted jejunal resection with prior hx of Jejunal diverticulitis. Pain under control, no nausea/vomiting. She is not needing any narcotics.  Passing flatus, no Bm yet.      - No prior chronic medical condition.   - BP on the higher side. Not starting any anti-hypertensive for now.   -  Diet and IVF per surgeon.       DVT Prophylaxis: Heparin SQ  Code Status: Full Code    Disposition: Expected discharge per surgeon plan    Interval History   She feels better, not an appetite yet, pain controlled, no N/V. Passing gas.     -Data reviewed today: I reviewed all new labs and imaging results over the last 24 hours. I personally reviewed the EKG tracing showing NSR in monitor .    Physical Exam   Temp: 97.5  F (36.4  C) Temp src: Oral BP: 136/87   Heart Rate: 73 Resp: 20 SpO2: 98 % O2 Device: None (Room air)    Vitals:    08/26/17 0846   Weight: 76 kg (167 lb 8 oz)     Vital Signs with Ranges  Temp:  [97.5  F (36.4  C)-98.2  F (36.8  C)] 97.5  F (36.4  C)  Heart Rate:  [72-80] 73  Resp:  [16-20] 20  BP: (132-148)/(82-93) 136/87  SpO2:  [98 %-99 %] 98 %  I/O last 3 completed shifts:  In: 3146 [P.O.:1120; I.V.:2026]  Out: 2900 [Urine:2900]    GEN:  Alert, oriented x 3, appears comfortable, NAD.  HEENT:  Normocephalic/atraumatic, no scleral icterus, no nasal discharge, mouth moist.  CV:  Regular rate and rhythm, no murmur or JVD.  S1 + S2 noted, no S3 or S4.  LUNGS:  Clear to auscultation bilaterally without rales/rhonchi/wheezing/retractions.   Symmetric chest rise on inhalation noted.  ABD:  Sites of normal aspect. Active bowel sounds, soft, non-tender/non-distended.  No rebound/guarding/rigidity.  EXT:  No edema or cyanosis.  No joint synovitis noted.  SKIN:  Dry to touch, no exanthems noted in the visualized areas.       Medications     dextrose 5% and 0.45% NaCl 75 mL/hr at 08/28/17 0954       sodium chloride (PF)  3 mL Intracatheter Q8H     heparin  5,000 Units Subcutaneous Q8H     pantoprazole (PROTONIX) IV PEDS/NICU  40 mg Intravenous Q24H       Data     Recent Labs  Lab 08/28/17  0819 08/25/17  1515    158       No results found for this or any previous visit (from the past 24 hour(s)).          Disclaimer: This note consists of symbols derived from keyboarding, dictation and/or voice recognition software. As a result, there may be errors in the script that have gone undetected. Please consider this when interpreting information found in this chart.

## 2017-08-29 VITALS
HEIGHT: 68 IN | TEMPERATURE: 97.6 F | RESPIRATION RATE: 16 BRPM | OXYGEN SATURATION: 98 % | DIASTOLIC BLOOD PRESSURE: 81 MMHG | BODY MASS INDEX: 25.39 KG/M2 | WEIGHT: 167.5 LBS | SYSTOLIC BLOOD PRESSURE: 137 MMHG

## 2017-08-29 PROCEDURE — 99239 HOSP IP/OBS DSCHRG MGMT >30: CPT | Performed by: HOSPITALIST

## 2017-08-29 PROCEDURE — 25000132 ZZH RX MED GY IP 250 OP 250 PS 637: Performed by: SURGERY

## 2017-08-29 PROCEDURE — 25000132 ZZH RX MED GY IP 250 OP 250 PS 637: Performed by: PHYSICIAN ASSISTANT

## 2017-08-29 RX ORDER — POLYETHYLENE GLYCOL 3350 17 G/17G
9 POWDER, FOR SOLUTION ORAL DAILY PRN
Status: DISCONTINUED | OUTPATIENT
Start: 2017-08-29 | End: 2017-08-29 | Stop reason: HOSPADM

## 2017-08-29 RX ORDER — SENNOSIDES 8.6 MG
1 TABLET ORAL 2 TIMES DAILY
Status: DISCONTINUED | OUTPATIENT
Start: 2017-08-29 | End: 2017-08-29 | Stop reason: HOSPADM

## 2017-08-29 RX ADMIN — PANTOPRAZOLE SODIUM 40 MG: 40 TABLET, DELAYED RELEASE ORAL at 08:34

## 2017-08-29 RX ADMIN — SENNOSIDES 1 TABLET: 8.6 TABLET, FILM COATED ORAL at 09:34

## 2017-08-29 RX ADMIN — POLYETHYLENE GLYCOL 3350 9 G: 17 POWDER, FOR SOLUTION ORAL at 09:34

## 2017-08-29 NOTE — PROGRESS NOTES
"[facility]   General Surgery Progress Note           Assessment and Plan:   Assessment:   -POD#4 s/p Laparoscopic assisted jejunal resection; Pathology: acute jejunal diverticulitis with associated serositis and adhesions      Plan:   -Diet advanced: soft diet, continue for 1-2 weeks  -add BID stool softener, 1/2 dose miralax daily prn  -Plan for DC today with follow-up in office in 2 weeks with Dr Lenz.  No rx per pt request.         Interval History:   Comfortable.  Tolerating diet.  +flatus, no BM for 2 days (since suppository).  Walking well.  Interested in more food.          Physical Exam:   Blood pressure 137/81, temperature 97.6  F (36.4  C), temperature source Oral, resp. rate 16, height 1.727 m (5' 8\"), weight 76 kg (167 lb 8 oz), SpO2 98 %, not currently breastfeeding.    I/O last 3 completed shifts:  In: 1964 [P.O.:665; I.V.:1299]  Out: 2250 [Urine:2250]    Abdomen:   soft, non-distended, tenderness noted at incsion    Inc - dressing and steri-strips clean, dry, intact, no erythema            Data:     Recent Labs  Lab 08/28/17  0819 08/25/17  1515    158     Pathology:  Jejunum, resection-   - Acute diverticulitis with associated serositis and adhesions.   - Unremarkable background jejunum.   - Negative for malignancy.    Lidia Desai PA-C         "

## 2017-08-29 NOTE — DISCHARGE SUMMARY
[facility]    Discharge Summary  Hospitalist    Date of Admission:  8/25/2017  Date of Discharge:  8/29/2017  Provider:  Royce Hernandez MD  Date of Service (when I last saw the patient): 08/29/17    Discharge Diagnoses   1. Post operative state s/p Laparoscopic assisted jejunal resection.  2. History of Jejunal diverticulitis    Other medical issues:  Past Medical History:   Diagnosis Date     Diverticula of intestine      Irregular heart beat     Benign arrythmia       History of Present Illness   Jennifer Payne is an 57 year old female who presented with abdominal pain.  Please see the admission history and physical for full details.    Hospital Course   Summary of Stay: Jennifer Payne is a 57 year old female with a history of recent hospitalization for acute jejunal diverticulitis who was admitted for  Laparoscopic assisted jejunal resection.  Uneventful course, no complications.       1. Post operative state s/p Laparoscopic assisted jejunal resection with prior hx of Jejunal diverticulitis. Pain under control, no nausea/vomiting. She is not needing any narcotics. Passing flatus.   - No prior chronic medical condition.   - BP on the higher side. Not starting any anti-hypertensive while here.   -  Diet (low residue) per surgeon.       Significant Results and Procedures   See below     Pending Results      Unresulted Labs Ordered in the Past 30 Days of this Admission     No orders found from 6/26/2017 to 8/26/2017.          Code Status   Full Code       Primary Care Physician   [facility]    GEN:  Alert, oriented x 3, appears comfortable, NAD.  HEENT:  Normocephalic/atraumatic, no scleral icterus, no nasal discharge, mouth moist.  CV:  Regular rate and rhythm, no murmur or JVD.  S1 + S2 noted, no S3 or S4.  LUNGS:  Clear to auscultation bilaterally without rales/rhonchi/wheezing/retractions.  Symmetric chest rise on inhalation noted.  ABD:  Active bowel sounds, soft,  non-tender/non-distended.  No rebound/guarding/rigidity.  EXT:  No edema or cyanosis.  No joint synovitis noted.  SKIN:  Dry to touch, no exanthems noted in the visualized areas.     Discharge Disposition   Discharged to home    Consultations This Hospital Stay   HOSPITALIST IP CONSULT    Time Spent on this Encounter   I, Royce Hernandez, personally saw the patient today and spent greater than 30 minutes discharging this patient.     Discharge Orders     Reason for your hospital stay   SB diverticulitis and infection     Follow-up and recommended labs and tests    Follow up with primary care provider, [facility], within 7 days for hospital follow- up.  No follow up labs or test are needed. Surgeon as per plan.     Activity   Your activity upon discharge: activity as tolerated     Full Code     Diet   Follow this diet upon discharge: Low residue (soft)       Discharge Medications   Current Discharge Medication List      CONTINUE these medications which have NOT CHANGED    Details   Vitamin D, Cholecalciferol, 1000 UNITS CAPS Take 1,000 Units by mouth daily      multivitamin, therapeutic (THERA-VIT) TABS tablet Take 1 tablet by mouth daily           Allergies   No Known Allergies  Data   Most Recent 3 CBC's:  Recent Labs   Lab Test  08/28/17   0819  08/25/17   1515  07/28/17   0650  07/27/17   0720  07/26/17   0633   WBC   --    --   7.9  13.6*  19.3*   HGB   --    --   11.2*  11.6*  12.0   MCV   --    --   94  94  95   PLT  222  158  322  276  238      Most Recent 3 BMP's:  Recent Labs   Lab Test  07/27/17   0720  07/26/17   0633  07/24/17   1113   NA  140  137  135   POTASSIUM  4.1  4.2  3.6   CHLORIDE  108  106  101   CO2  27  27  29   BUN  6*  7  10   CR  0.78  0.77  0.83   ANIONGAP  5  4  5   BRANDI  9.0  8.6  9.2   GLC  112*  126*  134*     Most Recent 2 LFT's:No lab results found.  Most Recent INR's and Anticoagulation Dosing History:  Anticoagulation Dose History     There is no flowsheet data to  display.        Most Recent 3 Troponin's:No lab results found.  Most Recent Cholesterol Panel:No lab results found.  Most Recent 6 Bacteria Isolates From Any Culture (See EPIC Reports for Culture Details):No lab results found.  Most Recent TSH, T4 and A1c Labs:No lab results found.  Results for orders placed or performed during the hospital encounter of 07/24/17   CT Abdomen Pelvis w Contrast    Narrative    CT ABDOMEN AND PELVIS WITH CONTRAST   7/24/2017 12:10 PM     HISTORY: Left lower quadrant pain. Failed outpatient management for  presumed diverticulitis.    COMPARISON: None.    TECHNIQUE: Following the uneventful administration of 85 mL Isovue-370  intravenous contrast, helical sections were acquired from the top of  the diaphragm through the pubic symphysis. Coronal reconstructions  were generated. Radiation dose for this scan was reduced using  automated exposure control, adjustment of the mA and/or kV according  to the patient's size, or iterative reconstruction technique.    FINDINGS:     Abdomen: Two subcentimeter low-attenuation lesions in the liver, too  small to characterize. The spleen, pancreas, adrenal glands and right  kidney are unremarkable. Several left renal peripelvic cysts. The  gallbladder is present. Small hiatal hernia. No enlarged lymph nodes  in the upper abdomen.    Scan through the lower chest is unremarkable.    Pelvis: The small and large bowel are normal in caliber. The appendix  is likely visualized and unremarkable. Several diverticula are present  within the colon and also a few scattered within the small bowel. A 6  cm diverticulum is present in the jejunum in the upper left  hemiabdomen (series 2 image 34 and series 3 image 48). The jejunum at  this location is mildly thick-walled. Moderate edema is present within  the mesenteric fat about this diverticulum. These findings are  consistent with jejunal diverticulitis. No extraluminal gas or  loculated fluid collections in the  abdomen or pelvis. The uterus is  not visualized. No enlarged lymph nodes in the pelvis. A very small  amount of free fluid in the pelvis.      Impression    IMPRESSION: Diverticulitis of a giant 6 cm diverticulum in the jejunum  in the upper left hemiabdomen. There is a moderate amount of edema  within the surrounding mesenteric fat. No visualized extraluminal gas  or abscess.    GIA VERMA MD           Disclaimer: This note consists of symbols derived from keyboarding, dictation and/or voice recognition software. As a result, there may be errors in the script that have gone undetected. Please consider this when interpreting information found in this chart.

## 2017-08-29 NOTE — PROGRESS NOTES
Pt to D/C to home.  Pt provided with d/c instructions, including new medications, when medications were last given, and when to take them again.  Pt also informed to f/u with primary within 7 days and Dr. Villegas within 2-3 weeks.  Pt verbalized understanding of all d/c and f/u instructions.  All questions were answered at this time.  Copy of paperwork sent with pt.  No medication/Scripts sent with pt.   to provide transport.  All personal belongings sent with pt.     Flu Vaccine: Recieved PTA  Pneumovax:  Not indicated

## 2017-08-29 NOTE — DISCHARGE INSTRUCTIONS
"HOME CARE FOLLOWING ABDOMINAL SURGERY  CARMENZA Hicks E. Gavin, N. Guttormson, D. Maurer, SHIELA Laura, MARCE Latif     RETURN APPOINTMENT:  Schedule a follow-up visit with Dr Lenz 2-3 weeks after surgery.  Office Phone:  [phone]    INCISIONAL CARE:  Replace the bandage over your incision (or incisions) until all drainage stops, or if more comfortable to have in place.  If present, leave the steri-strips (white paper tapes) in place for 14 days after surgery.  Taper abdominal binder use after 1 week; discontinue at 2 weeks.    BATHING:  Avoid baths for 1 week after surgery.  Showers are okay.  You may wash your hair at any time.  Gently pat your incision dry after bathing.    ACTIVITY:  Light Activity -- you may immediately be up and about as tolerated.  Driving -- you may drive when comfortable and off narcotic pain medications.  Light Work -- resume when comfortable off pain medications.  (If you can drive, you probably can work.)  Strenuous Work/Activity -- limit lifting to 20 pounds for 4 weeks.  Then, progressively increase with time.  Active Sports (running, biking, etc.) -- cautiously resume after 6 weeks.    DISCOMFORT:  Use pain medications as prescribed by your surgeon.  Take the pain medication with some food, when possible, to minimize side effects.  Expect gradual improvement.    DIET:  Continue on a \"soft\" diet (i.e. cooked vegetables, soups, processed meats, light/white bread, mashed potatoes, rice, yogurt) for the first one to two weeks after discharge from the hospital.  Drink plenty of fluids.    NAUSEA:  If nauseated from the anesthetic/pain meds; rest in bed, get up cautiously with assistance, and drink clear liquids (juice, tea, broth).     CONTACT US IF THE FOLLOWING DEVELOPS:   1. A fever that is above 101     2. If there is a large amount of drainage, bleeding, or swelling.   3. Severe pain that is not relieved by your prescription.   4. Drainage that is thick, cloudy, " yellow, green or white.   5. Any other questions not answered by  Frequently Asked Questions  sheet.      FREQUENTLY ASKED QUESTIONS:    Q:  How should my incision look?    A:  Normally your incision will appear slightly swollen with light redness directly along the incision itself as it heals.  It may feel like a bump or ridge as the healing/scarring happens, and over time (3-4 months) this bump or ridge feeling should slowly go away.  In general, clear or pink watery drainage can be normal at first as your incision heals, but should decrease over time.    Q:  How do I know if my incision is infected?  A:  Look at your incision for signs of infection, like redness around the incision spreading to surrounding skin, or drainage of cloudy or foul-smelling drainage.  If you feel warm, check your temperature to see if you are running a fever.    **If any of these things occur, please notify the nurse at our office.  We may need you to come into the office for an incision check.      Q:  How do I take care of my incision?  A:  If you have a dressing in place - Starting the day after surgery, replace the dressing 1-2 times a day until there is no further drainage from the incision.  At that time, a dressing is no longer needed.  Try to minimize tape on the skin if irritation is occurring at the tape sites.  If you have significant irritation from tape on the skin, please call the office to discuss other method of dressing your incision.    Small pieces of tape called  steri-strips  may be present directly overlying your incision; these may be removed 10 days after surgery unless otherwise specified by your surgeon.  If these tapes start to loosen at the ends, you may trim them back until they fall off or are removed.      Q:  There is a piece of tape or a sticky  lead  still on my skin.  Can I remove this?  A:  Sometimes the sticky  leads  used for monitoring during surgery or for evaluation in the emergency department  are not all removed while you are in the hospital.  These sometimes have a tab or metal dot on them.  You can easily remove these on your own, like taking off a band-aid.  If there is a gel substance under the  lead , simply wipe/clean it off with a washcloth or paper towel.      Q:  What can I do to minimize constipation (very hard stools, or lack of stools)?  A:  Stay well hydrated.  Increase your dietary fiber intake or take a fiber supplement -with plenty of water.  Walk around frequently.  You may consider an over-the-counter stool-softener.  Your Pharmacist can assist you with choosing one that is stocked at your pharmacy.  Constipation is also one of the most common side effects of pain medication.  If you are using pain medication, be pro-active and try to PREVENT problems with constipation by taking the steps above BEFORE constipation becomes a problem.    Q:  What do I do if I need more pain medications?  A:  Call the office to receive refills.  Be aware that certain pain meds cannot be called into a pharmacy and actually require a paper prescription.  A change may be made in your pain med as you progress thru your recovery period or if you have side effects to certain meds.    --Pain meds are NOT refilled after 5pm on weekdays, and NOT AT ALL on the weekends, so please look ahead to prevent problems.      Q:  Why am I having a hard time sleeping now that I am at home?  A:  Many medications you receive while you are in the hospital can impact your sleep for a number of days after your surgery/hospitalization.  Decreased level of activity and naps during the day may also make sleeping at night difficult.  Try to minimize day-time naps, and get up frequently during the day to walk around your home during your recovery time.  Sleep aides may be of some help, but are not recommended for long-term use.      Q:  I am having some back discomfort.  What should I do?  A:  This may be related to certain positioning  that was required for your surgery, extended periods of time in bed, or other changes in your overall activity level.  You may try ice, heat, acetaminophen, or ibuprofen to treat this temporarily.  Note that many pain medications have acetaminophen in them and would state this on the prescription bottle.  Be sure not to exceed the maximum of 4000mg per day of acetaminophen.     **If the pain you are having does not resolve, is severe, or is a flare of back pain you have had on other occasions prior to surgery, please contact your primary physician for further recommendations or for an appointment to be examined at their office.    Q:  Why am I having headaches?  A:  Headaches can be caused by many things:  caffeine withdrawal, use of pain meds, dehydration, high blood pressure, lack of sleep, over-activity/exhaustion, flare-up of usual migraine headaches.  If you feel this is related to muscle tension (a band-like feeling around the head, or a pressure at the low-back of the head) you may try ice or heat to this area.  You may need to drink more fluids (try electrolyte drink like Gatorade), rest, or take your usual migraine medications.   **If your headaches do not resolve, worsen, are accompanied by other symptoms, or if your blood pressure is high, please call your primary physician for recommendation and/or examination.    Q:  I am unable to urinate.  What do I do?  A:  A small percentage of people can have difficulty urinating initially after surgery.  This includes being able to urinate only a very small amount at a time and feeling discomfort or pressure in the very low abdomen.  This is called  urinary retention , and is actually an urgent situation.  Proceed to your nearest Emergency department for evaluation (not an Urgent Care Center).  Sometimes the bladder does not work correctly after certain medications you receive during surgery, or related to certain procedures.  You may need to have a catheter  placed until your bladder recovers.  When planning to go to an Emergency department, it may help to call the ER to let them know you are coming in for this problem after a surgery.  This may help you get in quicker to be evaluated.  **If you have symptoms of a urinary tract infection, please contact your primary physician for the proper evaluation and treatment.          If you have other questions, please call the office Monday thru Friday between 8am and 5pm to discuss with the nurse or physician assistant.  #[phone]    There is a surgeon ON CALL on weekday evenings and over the weekend in case of urgent need only, and may be contacted at the same number.    If you are having an emergency, call 911 or proceed to your nearest emergency department.

## 2017-08-29 NOTE — PLAN OF CARE
Problem: Goal Outcome Summary  Goal: Goal Outcome Summary  Outcome: Improving  Patient was admitted due to jejunoileal resection. IV fluids were discontinued and IV is saline locked.     Ambulatory Status:  Pt up ind.  Pain:  denies  Resp: LS clear.  GI:  denies nausea.  [place]. Full liquid diet. +BS.  Passing flatus.  Last BM 8/27 after suppository.  Skin:  CDI-dressing changed today after shower   Disposition:  d

## 2017-09-07 ENCOUNTER — OFFICE VISIT (OUTPATIENT)
Dept: SURGERY | Facility: CLINIC | Age: 57
End: 2017-09-07
Payer: COMMERCIAL

## 2017-09-07 VITALS
HEIGHT: 68 IN | WEIGHT: 167 LBS | BODY MASS INDEX: 25.31 KG/M2 | SYSTOLIC BLOOD PRESSURE: 128 MMHG | DIASTOLIC BLOOD PRESSURE: 80 MMHG

## 2017-09-07 DIAGNOSIS — Z09 SURGICAL FOLLOWUP VISIT: Primary | ICD-10-CM

## 2017-09-07 PROCEDURE — 99024 POSTOP FOLLOW-UP VISIT: CPT | Performed by: SURGERY

## 2017-09-07 NOTE — MR AVS SNAPSHOT
"              After Visit Summary   9/7/2017    Jennifer Payne    MRN: [id number]           Patient Information     Date Of Birth          1960        Visit Information        Provider Department      9/7/2017 3:30 PM Pj Lenz MD [facility] General [facility]      Today's Diagnoses     Surgical followup visit    -  1       Follow-ups after your visit        Who to contact     If you have questions or need follow up information about today's clinic visit or your schedule please contact [facility] directly at [phone].  Normal or non-critical lab and imaging results will be communicated to you by BetUknowhart, letter or phone within 4 business days after the clinic has received the results. If you do not hear from us within 7 days, please contact the clinic through SocialF5t or phone. If you have a critical or abnormal lab result, we will notify you by phone as soon as possible.  Submit refill requests through Qiwi Post or call your pharmacy and they will forward the refill request to us. Please allow 3 business days for your refill to be completed.          Additional Information About Your Visit        MyChart Information     [employer] gives you secure access to your electronic health record. If you see a primary care provider, you can also send messages to your care team and make appointments. If you have questions, please call your primary care clinic.  If you do not have a primary care provider, please call [phone] and they will assist you.        Care EveryWhere ID     This is your Care EveryWhere ID. This could be used by other organizations to access your [place] medical records  [id number]        Your Vitals Were     Height Breastfeeding? BMI (Body Mass Index)             5' 8\" (1.727 m) No 25.39 kg/m2          Blood Pressure from Last 3 Encounters:   09/07/17 128/80   08/29/17 137/81   08/14/17 120/88    Weight from Last 3 " Encounters:   09/07/17 167 lb (75.8 kg)   08/26/17 167 lb 8 oz (76 kg)   08/14/17 165 lb (74.8 kg)              Today, you had the following     No orders found for display       Primary Care Provider    Campbell Alcantar LPN       No address on file        Equal Access to Services     [facility] Hadii aad ku hadasho Soomaali, waaxda luqadaha, qaybta kaalmada adeegyada, waxay dongin hayoleg goodwinolgamike al . So [facility] [phone].    ATENCIÓN: Si habla español, tiene a carter disposición servicios gratuitos de asistencia lingüística. Llame al [phone].    We comply with applicable federal civil rights laws and [place] laws. We do not discriminate on the basis of race, color, national origin, age, disability sex, sexual orientation or gender identity.            Thank you!     Thank you for choosing [facility]  for your care. Our goal is always to provide you with excellent care. Hearing back from our patients is one way we can continue to improve our services. Please take a few minutes to complete the written survey that you may receive in the mail after your visit with us. Thank you!             Your Updated Medication List - Protect others around you: Learn how to safely use, store and throw away your medicines at www.disposemymeds.org.          This list is accurate as of: 9/7/17  4:09 PM.  Always use your most recent med list.                   Brand Name Dispense Instructions for use Diagnosis    multivitamin, therapeutic Tabs tablet      Take 1 tablet by mouth daily        Vitamin D (Cholecalciferol) 1000 UNITS Caps      Take 1,000 Units by mouth daily

## 2017-09-07 NOTE — PROGRESS NOTES
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  Pathology was benign.  She is return to routine activity but does feel fatigued at times.  Bowel function is reported as normal.    Exam: Incisions are healing nicely without any sign of infection or complication.  Abdomen is soft and nontender and nondistended    Impression: Excellent postoperative recovery from laparoscopic assisted jejunal resection for jejunal diverticulitis    Plan: She will resume normal diet and activity as tolerated contact us if any questions or problems arise    Pj Lenz MD  9/7/2017 4:09 PM    Please route or send letter to:  Primary Care Provider (PCP) and Include Progress Note

## 2017-10-23 ENCOUNTER — TRANSFERRED RECORDS (OUTPATIENT)
Dept: HEALTH INFORMATION MANAGEMENT | Facility: CLINIC | Age: 57
End: 2017-10-23

## 2017-10-23 DIAGNOSIS — Z86.79 ATRIAL FIBRILLATION, CURRENTLY IN SINUS RHYTHM: Primary | ICD-10-CM

## 2017-10-31 ENCOUNTER — HOSPITAL ENCOUNTER (OUTPATIENT)
Dept: CARDIOLOGY | Facility: CLINIC | Age: 57
Discharge: HOME | End: 2017-10-31
Admitting: EMERGENCY MEDICINE
Payer: COMMERCIAL

## 2017-10-31 DIAGNOSIS — Z86.79 ATRIAL FIBRILLATION, CURRENTLY IN SINUS RHYTHM: ICD-10-CM

## 2017-10-31 PROCEDURE — 93226 XTRNL ECG REC<48 HR SCAN A/R: CPT | Performed by: EMERGENCY MEDICINE

## 2017-10-31 PROCEDURE — 93227 XTRNL ECG REC<48 HR R&I: CPT | Performed by: INTERNAL MEDICINE

## 2018-06-01 NOTE — LETTER
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  She has completed her antibiotic course and feels well.  She has had no fevers.  Her bowels were irregular while on antibiotics and she has noticed some constipation after discontinuing the antibiotics.  She had a small amount of bright red blood with a difficult bowel movement but this is not unusual for her as she has had hemorrhoid problems.     We discussed surgery in detail including incision, scar, conversion to open surgery, infection, bleeding, expected recovery both with laparoscopic and open surgery.  If she has recurrence of symptoms, she will return to the emergency room to be admitted for antibiotic therapy and we will pursue more urgent surgery.     All her questions were answered and she is ready to schedule surgery.  We will do so in the next week or two.     Pj Lenz MD   Z Plasty Text: The lesion was extirpated to the level of the fat with a #15 scalpel blade.  Given the location of the defect, shape of the defect and the proximity to free margins a Z-plasty was deemed most appropriate for repair.  Using a sterile surgical marker, the appropriate transposition arms of the Z-plasty were drawn incorporating the defect and placing the expected incisions within the relaxed skin tension lines where possible.    The area thus outlined was incised deep to adipose tissue with a #15 scalpel blade.  The skin margins were undermined to an appropriate distance in all directions utilizing iris scissors.  The opposing transposition arms were then transposed into place in opposite direction and anchored with interrupted buried subcutaneous sutures.

## 2019-09-29 ENCOUNTER — HEALTH MAINTENANCE LETTER (OUTPATIENT)
Age: 59
End: 2019-09-29

## 2021-01-14 ENCOUNTER — HEALTH MAINTENANCE LETTER (OUTPATIENT)
Age: 61
End: 2021-01-14

## 2021-10-24 ENCOUNTER — HEALTH MAINTENANCE LETTER (OUTPATIENT)
Age: 61
End: 2021-10-24

## 2022-02-13 ENCOUNTER — HEALTH MAINTENANCE LETTER (OUTPATIENT)
Age: 62
End: 2022-02-13

## 2022-10-15 ENCOUNTER — HEALTH MAINTENANCE LETTER (OUTPATIENT)
Age: 62
End: 2022-10-15

## 2023-03-26 ENCOUNTER — HEALTH MAINTENANCE LETTER (OUTPATIENT)
Age: 63
End: 2023-03-26

## 2023-10-29 ENCOUNTER — HEALTH MAINTENANCE LETTER (OUTPATIENT)
Age: 63
End: 2023-10-29

## (undated) DEVICE — PREP SCRUB SOL EXIDINE 4% CHG 4OZ 29002-404

## (undated) DEVICE — SPONGE LAP 18X18" X8435

## (undated) DEVICE — SU PDS II 0 CT-2 27" Z334H

## (undated) DEVICE — STPL LINEAR 90 X 3.5MM TA9035S

## (undated) DEVICE — GOWN IMPERVIOUS ZONED XLG 9041

## (undated) DEVICE — SUCTION IRR STRYKERFLOW II W/TIP 250-070-520

## (undated) DEVICE — ESU PENCIL W/HOLSTER E2350H

## (undated) DEVICE — BAG CLEAR TRASH 1.3M 39X33" P4040C

## (undated) DEVICE — ENDO TROCAR 05MM VERSAONE BLADELESS W/STD FIX CAN NONB5STF

## (undated) DEVICE — GLOVE PROTEXIS W/NEU-THERA 7.5  2D73TE75

## (undated) DEVICE — ENDO CANNULA 05MM VERSAONE UNIVERSAL UNVCA5STF

## (undated) DEVICE — GLOVE PROTEXIS BLUE W/NEU-THERA 8.0  2D73EB80

## (undated) DEVICE — SU VICRYL 3-0 SH 27" J316H

## (undated) DEVICE — TUBING SUCTION 12"X1/4" N612

## (undated) DEVICE — SU VICRYL 1 CT-2 27" J335H

## (undated) DEVICE — ESU LIGASURE IMPACT OPEN SEALER/DVDR CVD LG JAW LF4418

## (undated) DEVICE — ENDO TROCAR BLUNT 100MM W/THRD ANCHOR BLUNTPORT BPT12STS

## (undated) DEVICE — STPL ENDO GIA 12MM UNIV 030449

## (undated) DEVICE — LINEN POUCH DBL 5427

## (undated) DEVICE — LINEN FULL SHEET 5511

## (undated) DEVICE — STPL ENDO HANDLE GIA ULTRA UNIVERSAL STD EGIAUSTND

## (undated) DEVICE — SOL NACL 0.9% INJ 1000ML BAG 2B1324X

## (undated) DEVICE — Device

## (undated) DEVICE — SU VICRYL 4-0 PS-2 18" UND J496H

## (undated) DEVICE — STPL RELOAD 80 X 3.8MM GIA8038L

## (undated) DEVICE — SUCTION TIP YANKAUER W/O VENT K86

## (undated) DEVICE — GLOVE PROTEXIS BLUE W/NEU-THERA 7.5  2D73EB75

## (undated) DEVICE — BNDG ABDOMINAL BINDER 9X30-45" 79-89070

## (undated) DEVICE — GLOVE PROTEXIS W/NEU-THERA 7.0  2D73TE70

## (undated) DEVICE — ESU GROUND PAD ADULT W/CORD E7507

## (undated) DEVICE — LINEN TOWEL PACK X5 5464

## (undated) DEVICE — PREP SKIN SCRUB TRAY 4461A

## (undated) DEVICE — ESU ELEC BLADE 2.75" COATED/INSULATED E1455

## (undated) DEVICE — SUCTION CANISTER MEDIVAC LINER 3000ML W/LID 65651-530

## (undated) DEVICE — SYR 30ML SLIP TIP W/O NDL 302833

## (undated) DEVICE — STPL 80 X 3.8MM GIA8038S

## (undated) DEVICE — SUCTION TIP POOLE K770

## (undated) DEVICE — LINEN HALF SHEET 5512

## (undated) DEVICE — ESU CORD MONOPOLAR 10'  E0510

## (undated) RX ORDER — KETOROLAC TROMETHAMINE 30 MG/ML
INJECTION, SOLUTION INTRAMUSCULAR; INTRAVENOUS
Status: DISPENSED
Start: 2017-08-25

## (undated) RX ORDER — ONDANSETRON 2 MG/ML
INJECTION INTRAMUSCULAR; INTRAVENOUS
Status: DISPENSED
Start: 2017-08-25

## (undated) RX ORDER — LIDOCAINE HYDROCHLORIDE 10 MG/ML
INJECTION, SOLUTION EPIDURAL; INFILTRATION; INTRACAUDAL; PERINEURAL
Status: DISPENSED
Start: 2017-08-25

## (undated) RX ORDER — NEOSTIGMINE METHYLSULFATE 5 MG/5 ML
SYRINGE (ML) INTRAVENOUS
Status: DISPENSED
Start: 2017-08-25

## (undated) RX ORDER — FENTANYL CITRATE 50 UG/ML
INJECTION, SOLUTION INTRAMUSCULAR; INTRAVENOUS
Status: DISPENSED
Start: 2017-08-25

## (undated) RX ORDER — ACETAMINOPHEN 10 MG/ML
INJECTION, SOLUTION INTRAVENOUS
Status: DISPENSED
Start: 2017-08-25

## (undated) RX ORDER — PROPOFOL 10 MG/ML
INJECTION, EMULSION INTRAVENOUS
Status: DISPENSED
Start: 2017-08-25

## (undated) RX ORDER — BUPIVACAINE HYDROCHLORIDE AND EPINEPHRINE 2.5; 5 MG/ML; UG/ML
INJECTION, SOLUTION EPIDURAL; INFILTRATION; INTRACAUDAL; PERINEURAL
Status: DISPENSED
Start: 2017-08-25

## (undated) RX ORDER — GLYCOPYRROLATE 0.2 MG/ML
INJECTION INTRAMUSCULAR; INTRAVENOUS
Status: DISPENSED
Start: 2017-08-25

## (undated) RX ORDER — ERTAPENEM 1 G/1
INJECTION, POWDER, LYOPHILIZED, FOR SOLUTION INTRAMUSCULAR; INTRAVENOUS
Status: DISPENSED
Start: 2017-08-25

## (undated) RX ORDER — DEXAMETHASONE SODIUM PHOSPHATE 4 MG/ML
INJECTION, SOLUTION INTRA-ARTICULAR; INTRALESIONAL; INTRAMUSCULAR; INTRAVENOUS; SOFT TISSUE
Status: DISPENSED
Start: 2017-08-25